# Patient Record
Sex: FEMALE | Race: WHITE | NOT HISPANIC OR LATINO | Employment: OTHER | URBAN - METROPOLITAN AREA
[De-identification: names, ages, dates, MRNs, and addresses within clinical notes are randomized per-mention and may not be internally consistent; named-entity substitution may affect disease eponyms.]

---

## 2023-11-06 ENCOUNTER — OFFICE VISIT (OUTPATIENT)
Dept: PRIMARY CARE | Facility: CLINIC | Age: 48
End: 2023-11-06
Payer: MEDICARE

## 2023-11-06 VITALS
DIASTOLIC BLOOD PRESSURE: 72 MMHG | HEIGHT: 67 IN | BODY MASS INDEX: 39.36 KG/M2 | HEART RATE: 84 BPM | WEIGHT: 250.8 LBS | SYSTOLIC BLOOD PRESSURE: 122 MMHG

## 2023-11-06 DIAGNOSIS — R23.2 HOT FLASHES: ICD-10-CM

## 2023-11-06 DIAGNOSIS — E11.42 DIABETIC POLYNEUROPATHY ASSOCIATED WITH TYPE 2 DIABETES MELLITUS (MULTI): ICD-10-CM

## 2023-11-06 DIAGNOSIS — F17.210 CIGARETTE NICOTINE DEPENDENCE WITHOUT COMPLICATION: ICD-10-CM

## 2023-11-06 DIAGNOSIS — F32.0 CURRENT MILD EPISODE OF MAJOR DEPRESSIVE DISORDER WITHOUT PRIOR EPISODE (CMS-HCC): ICD-10-CM

## 2023-11-06 DIAGNOSIS — Z23 NEED FOR INFLUENZA VACCINATION: ICD-10-CM

## 2023-11-06 DIAGNOSIS — Z23 ENCOUNTER FOR IMMUNIZATION: ICD-10-CM

## 2023-11-06 DIAGNOSIS — R41.89 COGNITIVE DECLINE: ICD-10-CM

## 2023-11-06 DIAGNOSIS — E78.5 DYSLIPIDEMIA: ICD-10-CM

## 2023-11-06 DIAGNOSIS — G43.E09 CHRONIC MIGRAINE WITH AURA WITHOUT STATUS MIGRAINOSUS, NOT INTRACTABLE: ICD-10-CM

## 2023-11-06 DIAGNOSIS — Z12.31 ENCOUNTER FOR SCREENING MAMMOGRAM FOR BREAST CANCER: ICD-10-CM

## 2023-11-06 DIAGNOSIS — F41.9 ANXIETY: ICD-10-CM

## 2023-11-06 DIAGNOSIS — M54.9 CHRONIC BACK PAIN, UNSPECIFIED BACK LOCATION, UNSPECIFIED BACK PAIN LATERALITY: ICD-10-CM

## 2023-11-06 DIAGNOSIS — R19.7 DIARRHEA, UNSPECIFIED TYPE: Primary | ICD-10-CM

## 2023-11-06 DIAGNOSIS — E11.9 TYPE 2 DIABETES MELLITUS WITHOUT COMPLICATION, WITHOUT LONG-TERM CURRENT USE OF INSULIN (MULTI): ICD-10-CM

## 2023-11-06 DIAGNOSIS — F51.01 PRIMARY INSOMNIA: ICD-10-CM

## 2023-11-06 DIAGNOSIS — K21.9 GASTROESOPHAGEAL REFLUX DISEASE, UNSPECIFIED WHETHER ESOPHAGITIS PRESENT: ICD-10-CM

## 2023-11-06 DIAGNOSIS — G89.29 CHRONIC BACK PAIN, UNSPECIFIED BACK LOCATION, UNSPECIFIED BACK PAIN LATERALITY: ICD-10-CM

## 2023-11-06 PROCEDURE — 3074F SYST BP LT 130 MM HG: CPT | Performed by: STUDENT IN AN ORGANIZED HEALTH CARE EDUCATION/TRAINING PROGRAM

## 2023-11-06 PROCEDURE — G0008 ADMIN INFLUENZA VIRUS VAC: HCPCS | Performed by: STUDENT IN AN ORGANIZED HEALTH CARE EDUCATION/TRAINING PROGRAM

## 2023-11-06 PROCEDURE — 90686 IIV4 VACC NO PRSV 0.5 ML IM: CPT | Performed by: STUDENT IN AN ORGANIZED HEALTH CARE EDUCATION/TRAINING PROGRAM

## 2023-11-06 PROCEDURE — 99205 OFFICE O/P NEW HI 60 MIN: CPT | Performed by: STUDENT IN AN ORGANIZED HEALTH CARE EDUCATION/TRAINING PROGRAM

## 2023-11-06 PROCEDURE — G0009 ADMIN PNEUMOCOCCAL VACCINE: HCPCS | Performed by: STUDENT IN AN ORGANIZED HEALTH CARE EDUCATION/TRAINING PROGRAM

## 2023-11-06 PROCEDURE — 4010F ACE/ARB THERAPY RXD/TAKEN: CPT | Performed by: STUDENT IN AN ORGANIZED HEALTH CARE EDUCATION/TRAINING PROGRAM

## 2023-11-06 PROCEDURE — 3078F DIAST BP <80 MM HG: CPT | Performed by: STUDENT IN AN ORGANIZED HEALTH CARE EDUCATION/TRAINING PROGRAM

## 2023-11-06 PROCEDURE — 90677 PCV20 VACCINE IM: CPT | Performed by: STUDENT IN AN ORGANIZED HEALTH CARE EDUCATION/TRAINING PROGRAM

## 2023-11-06 RX ORDER — FAMOTIDINE 10 MG/1
10 TABLET ORAL 2 TIMES DAILY
COMMUNITY
End: 2023-11-16 | Stop reason: SDUPTHER

## 2023-11-06 RX ORDER — ROSUVASTATIN CALCIUM 20 MG/1
1 TABLET, COATED ORAL
COMMUNITY
Start: 2021-08-29 | End: 2023-12-04 | Stop reason: SDUPTHER

## 2023-11-06 RX ORDER — DILTIAZEM HYDROCHLORIDE 60 MG/1
60 TABLET, FILM COATED ORAL 2 TIMES DAILY
COMMUNITY

## 2023-11-06 RX ORDER — BUPROPION HYDROCHLORIDE 150 MG/1
150 TABLET ORAL EVERY MORNING
Qty: 30 TABLET | Refills: 2 | Status: SHIPPED | OUTPATIENT
Start: 2023-11-06 | End: 2024-03-18 | Stop reason: SDUPTHER

## 2023-11-06 RX ORDER — DIPHENHYDRAMINE HCL 25 MG
4 CAPSULE ORAL AS NEEDED
Qty: 100 EACH | Refills: 0 | Status: SHIPPED | OUTPATIENT
Start: 2023-11-06 | End: 2024-03-24 | Stop reason: SINTOL

## 2023-11-06 RX ORDER — LISINOPRIL 5 MG/1
5 TABLET ORAL DAILY
COMMUNITY
Start: 2021-06-23

## 2023-11-06 RX ORDER — PREGABALIN 300 MG/1
300 CAPSULE ORAL 2 TIMES DAILY
COMMUNITY
Start: 2021-04-28 | End: 2023-11-16 | Stop reason: SDUPTHER

## 2023-11-06 RX ORDER — DULOXETIN HYDROCHLORIDE 60 MG/1
60 CAPSULE, DELAYED RELEASE ORAL DAILY
COMMUNITY

## 2023-11-06 RX ORDER — AMITRIPTYLINE HYDROCHLORIDE 10 MG/1
1 TABLET, FILM COATED ORAL NIGHTLY
COMMUNITY
Start: 2023-02-28 | End: 2023-12-04 | Stop reason: SDUPTHER

## 2023-11-06 RX ORDER — TIZANIDINE 2 MG/1
2 TABLET ORAL EVERY MORNING
Qty: 90 TABLET | Refills: 1 | Status: SHIPPED | OUTPATIENT
Start: 2023-11-06 | End: 2024-01-18 | Stop reason: SDUPTHER

## 2023-11-06 RX ORDER — ARIPIPRAZOLE 15 MG/1
15 TABLET ORAL DAILY
COMMUNITY
End: 2023-12-04 | Stop reason: SDUPTHER

## 2023-11-06 RX ORDER — QUETIAPINE FUMARATE 50 MG/1
1 TABLET, FILM COATED ORAL NIGHTLY
COMMUNITY
Start: 2015-11-05 | End: 2023-11-06 | Stop reason: SDUPTHER

## 2023-11-06 RX ORDER — QUETIAPINE FUMARATE 25 MG/1
1 TABLET, FILM COATED ORAL NIGHTLY
COMMUNITY
Start: 2022-08-25 | End: 2023-11-06 | Stop reason: SDUPTHER

## 2023-11-06 RX ORDER — OMEPRAZOLE 40 MG/1
40 CAPSULE, DELAYED RELEASE ORAL
Qty: 90 CAPSULE | Refills: 1 | Status: SHIPPED | OUTPATIENT
Start: 2023-11-06 | End: 2024-03-26

## 2023-11-06 RX ORDER — DULOXETIN HYDROCHLORIDE 30 MG/1
1 CAPSULE, DELAYED RELEASE ORAL DAILY
COMMUNITY
Start: 2015-11-05

## 2023-11-06 RX ORDER — DICYCLOMINE HYDROCHLORIDE 10 MG/1
10 CAPSULE ORAL 4 TIMES DAILY PRN
Qty: 30 CAPSULE | Refills: 3 | Status: SHIPPED | OUTPATIENT
Start: 2023-11-06 | End: 2023-12-18 | Stop reason: SDUPTHER

## 2023-11-06 RX ORDER — MEMANTINE HYDROCHLORIDE 10 MG/1
1 TABLET ORAL DAILY
COMMUNITY
Start: 2023-06-19 | End: 2023-12-18 | Stop reason: SDUPTHER

## 2023-11-06 RX ORDER — QUETIAPINE FUMARATE 50 MG/1
50 TABLET, FILM COATED ORAL NIGHTLY
Qty: 90 TABLET | Refills: 1 | Status: SHIPPED | OUTPATIENT
Start: 2023-11-06 | End: 2024-05-23 | Stop reason: SDUPTHER

## 2023-11-06 RX ORDER — FENOFIBRATE 40 MG/1
80 TABLET ORAL
COMMUNITY
End: 2023-12-04 | Stop reason: SDUPTHER

## 2023-11-06 RX ORDER — ALBUTEROL SULFATE 90 UG/1
2 AEROSOL, METERED RESPIRATORY (INHALATION) EVERY 6 HOURS PRN
COMMUNITY
Start: 2019-09-22 | End: 2024-03-21 | Stop reason: SDUPTHER

## 2023-11-06 RX ORDER — METFORMIN HYDROCHLORIDE 500 MG/1
1000 TABLET ORAL
COMMUNITY
Start: 2018-01-26

## 2023-11-06 RX ORDER — TIZANIDINE 2 MG/1
2 TABLET ORAL EVERY MORNING
COMMUNITY
Start: 2023-04-10 | End: 2023-11-06 | Stop reason: SDUPTHER

## 2023-11-06 RX ORDER — QUETIAPINE FUMARATE 25 MG/1
25 TABLET, FILM COATED ORAL NIGHTLY
Qty: 90 TABLET | Refills: 1 | Status: SHIPPED | OUTPATIENT
Start: 2023-11-06 | End: 2024-03-26

## 2023-11-06 NOTE — PROGRESS NOTES
Subjective   Patient ID: Usha Rowland is a 48 y.o. female who presents for establish care. Previous provider Dr Palomares at [facility] in [place]. Discuss wellbutrin to stop smoking     HPI    DM2 - diagnosed around 2013, managed on max metformin alone, struggles with neuropathy in both hands and feet, HbA1c 7.5% 9/13/2023, struggling with finances which impacts diet, uses food pantry, will try to make healthy choices with what's available, last eye check within past 1yr prior to moving to the area, hasn't established locally, plans to schedule, doesn't remember why she's taking lisinopril, doesn't think it's for BP (but also on Cardizem)    Neuropathy - managed on Lyrica (300mg bid) and Cymbalta (90mg every day) which provides relief, affects both hands and feet, states Cymbalta initially started for depression, Lyrica started in 2013, tried gabapentin first which didn't help so transitioned to Lyrica, thinks tingling and burning in fingers is getting a little worse with time, checks feet daily    HLD - managed on Crestor 20mg at bedtime and fenofibrate, tolerates without problem, tries to pay attention to diet    Migraines - started having migraines >25yrs ago after birth of son, managed on amitriptyline, uses Excedrin as needed, multiple ED visits recently, has been on multiple preventive medications in the past, thinks was on Topamax and propranolol which did not help, Imitrex did not help, >15 migraine days per month, has aura (flashing lights)    Anx/dep - managed on Cymbalta, Seroquel (also helps with sleep), Abilify, amitriptyline, has been on current regimen for many years, feeling well at this time, does have a little seasonal depression in winter months    Cognition - struggles with short term memory, started many years ago, managed on Namenda, started by neurology in [place], not sure if it's helping    Heartburn - managed on omeprazole and famotidine, has recurrence of symptoms if she misses a dose,  does have diffuse abd cramping after she eats anything, takes Metamucil which helps some, has diarrhea when she doesn't, cramping relieved by BM    Hot flashes - struggling with frequent daily hot flashes, now having some occasional night sweats, she is s/p endometrial ablation x2 so no menses    Chronic back pain - pain well-controlled on current dose of prn tizanidine, requests refill    She's not sure why she's taking the Cardizem or lisinopril - will attempt to obtain records for review    Cervical Cancer Screening: she's not sure about last pap, no hx abnormal, s/p endometrial ablation x2, no bleeding since  Breast Cancer Screening: due  Osteoporosis Screening: plan to start at age 67  Colon Cancer Screenin, [facility] in [place], thinks wnl, not sure about repeat, thinks had EGD at same time which was wnl per pt report  Tobacco: currently smoking 2ppd >20yrs, wants to quit, was on Chantix for some time but affected mental health, wants to try Wellbutrin, no hx seizure, struggles with getting patches to stick to her skin, wants to try gum  Alcohol: rare  Recreational Drugs: hx marijuana use  Immunizations: influenza and [id number] today     Review of Systems   Constitutional:  Negative for chills and fever.   HENT:  Negative for congestion and rhinorrhea.    Eyes:  Negative for pain and redness.   Respiratory:  Negative for cough and shortness of breath.    Cardiovascular:  Negative for chest pain, palpitations and leg swelling.   Gastrointestinal:  Positive for abdominal pain and diarrhea. Negative for blood in stool, constipation, nausea and vomiting.   Endocrine: Positive for heat intolerance. Negative for cold intolerance.   Genitourinary:  Negative for dysuria and flank pain.   Musculoskeletal:  Positive for back pain. Negative for arthralgias and myalgias.   Skin:  Negative for rash.   Neurological:  Positive for headaches. Negative for dizziness, weakness and numbness.   Hematological:   "Negative for adenopathy.   Psychiatric/Behavioral:  Negative for dysphoric mood and sleep disturbance. The patient is not nervous/anxious.      Objective   /72   Pulse 84   Ht 1.702 m (5' 7\")   Wt 114 kg (250 lb 12.8 oz)   BMI 39.28 kg/m²     Physical Exam  Constitutional:       Appearance: Normal appearance.   HENT:      Head: Normocephalic and atraumatic.   Eyes:      General: No scleral icterus.     Conjunctiva/sclera: Conjunctivae normal.   Cardiovascular:      Rate and Rhythm: Normal rate and regular rhythm.      Heart sounds: No murmur heard.  Pulmonary:      Effort: Pulmonary effort is normal. No respiratory distress.      Breath sounds: Normal breath sounds.   Musculoskeletal:         General: No swelling. Normal range of motion.      Cervical back: Normal range of motion and neck supple.   Skin:     General: Skin is warm and dry.      Findings: No rash.   Neurological:      General: No focal deficit present.      Mental Status: She is alert.   Psychiatric:         Mood and Affect: Mood normal.         Behavior: Behavior normal.       Assessment/Plan   Problem List Items Addressed This Visit             ICD-10-CM    Type 2 diabetes mellitus without complication, without long-term current use of insulin (CMS/[facility]) E11.9     Managed on metformin alone. Will proceed with annual DM2 labs and will follow up with results. Encourage pt to schedule annual eye exam.         Relevant Orders    Follow Up In Primary Care - Medicare Annual    Albumin , Urine Random    CBC and Auto Differential    Comprehensive Metabolic Panel    Hemoglobin A1C    Lipid Panel    TSH with reflex to Free T4 if abnormal    Vitamin B12    Primary insomnia F51.01     Well-controlled with Seroquel and amitriptyline. Will continue.         Relevant Medications    QUEtiapine (SEROquel) 50 mg tablet    QUEtiapine (SEROquel) 25 mg tablet    Hot flashes R23.2     Likely perimenopausal. Will check FSH and well as additional screening labs.   "       Relevant Orders    FSH    Gastroesophageal reflux disease K21.9     Well-controlled with PPI and famotidine. Will continue. Reviewed lifestyle modifications - avoidance of food triggers, sit upright for 30min after meals, no large meals 2hrs prior to bed. Return precautions reviewed.          Relevant Medications    omeprazole (PriLOSEC) 40 mg DR capsule    Dyslipidemia E78.5     Will check FLP and follow up with results. Continue Crestor and fenofibrate for now.         Diarrhea - Primary R19.7     Symptoms c/w IBS-D. Reviewed conservative measures and dietary goals. Will trial Bentyl. Medication dosing and side effects reviewed.          Relevant Medications    dicyclomine (Bentyl) 10 mg capsule    Other Relevant Orders    Follow Up In Primary Care - Medicare Annual    Diabetic polyneuropathy associated with type 2 diabetes mellitus (CMS/[facility]) E11.42     Paresthesias in hands and feet. Symptoms well-controlled with current doses of Cymbalta and Lyrica. Will attempt to obtain records for review.          Current mild episode of major depressive disorder without prior episode (CMS/HCC) F32.0     Doing well on current regimen (Cymbalta, Seroquel, Abilify, amitriptyline). No changes today.         Cognitive decline R41.89     Followed with neurology in [place] prior to moving to [place]. Managed on Namenda. Will provide referral for local neurology establishment.         Cigarette nicotine dependence without complication F17.210     Ready to quit. She is unable to tolerate Chantix. Will trial Wellbutrin + NRT (gum). Medication dosing and side effects reviewed. Follow up in 6wk for recheck, sooner if needed.          Relevant Medications    buPROPion XL (Wellbutrin XL) 150 mg 24 hr tablet    nicotine polacrilex (Nicorette) 4 mg gum    Other Relevant Orders    Follow Up In Primary Care - Medicare Annual    Chronic migraine with aura without status migrainosus, not intractable G43.E09     Currently managed on amitriptyline  and having >15 migraine days per month. Tried and failed Topamax, propranolol, triptans per pt report. Using shared decision making, we decided to proceed with neurology eval.         Relevant Orders    Referral to Neurology    Follow Up In Primary Care - Medicare Annual    Chronic back pain M54.9, G89.29     Doing well with tizanidine. Will continue.         Relevant Medications    tiZANidine (Zanaflex) 2 mg tablet    Anxiety F41.9     Doing well on current regimen (Cymbalta, Seroquel, Abilify, amitriptyline). No changes today.          Other Visit Diagnoses         Codes    Encounter for screening mammogram for breast cancer     Z12.31    Relevant Orders    BI mammo bilateral screening tomosynthesis    Follow Up In Primary Care - Medicare Annual    Need for influenza vaccination     Z23    Relevant Orders    Flu vaccine (IIV4) age 6 months and greater, preservative free (Completed)    Follow Up In Primary Care - Medicare Annual    Encounter for immunization     Z23    Relevant Orders    Pneumococcal conjugate vaccine, 20-valent (PREVNAR 20) (Completed)    Follow Up In Primary Care - Medicare Annual        Follow up in [facility] for recheck, sooner if needed.   Time Spent  Prep time on day of patient encounter: 4 minutes  Time spent directly with patient, family or caregiver: 41 minutes  Additional Time Spent on Patient Care Activities: 0 minutes  Documentation Time: 15 minutes  Other Time Spent: 0 minutes  Total: 60 minutes

## 2023-11-13 PROBLEM — K21.9 GASTROESOPHAGEAL REFLUX DISEASE: Status: ACTIVE | Noted: 2023-11-13

## 2023-11-13 PROBLEM — E11.9 TYPE 2 DIABETES MELLITUS WITHOUT COMPLICATION, WITHOUT LONG-TERM CURRENT USE OF INSULIN (MULTI): Status: ACTIVE | Noted: 2023-11-13

## 2023-11-13 PROBLEM — G89.29 CHRONIC BACK PAIN: Status: ACTIVE | Noted: 2023-11-13

## 2023-11-13 PROBLEM — M54.9 CHRONIC BACK PAIN: Status: ACTIVE | Noted: 2023-11-13

## 2023-11-13 PROBLEM — R41.89 COGNITIVE DECLINE: Status: ACTIVE | Noted: 2023-11-13

## 2023-11-13 PROBLEM — E11.42 DIABETIC POLYNEUROPATHY ASSOCIATED WITH TYPE 2 DIABETES MELLITUS (MULTI): Status: ACTIVE | Noted: 2023-11-13

## 2023-11-13 PROBLEM — F41.9 ANXIETY: Status: ACTIVE | Noted: 2023-11-13

## 2023-11-13 PROBLEM — R19.7 DIARRHEA: Status: ACTIVE | Noted: 2023-11-13

## 2023-11-13 PROBLEM — F17.210 CIGARETTE NICOTINE DEPENDENCE WITHOUT COMPLICATION: Status: ACTIVE | Noted: 2023-11-13

## 2023-11-13 PROBLEM — F32.0 CURRENT MILD EPISODE OF MAJOR DEPRESSIVE DISORDER WITHOUT PRIOR EPISODE (CMS-HCC): Status: ACTIVE | Noted: 2023-11-13

## 2023-11-13 PROBLEM — F51.01 PRIMARY INSOMNIA: Status: ACTIVE | Noted: 2023-11-13

## 2023-11-13 PROBLEM — R23.2 HOT FLASHES: Status: ACTIVE | Noted: 2023-11-13

## 2023-11-13 PROBLEM — E78.5 DYSLIPIDEMIA: Status: ACTIVE | Noted: 2023-11-13

## 2023-11-13 PROBLEM — G43.E09 CHRONIC MIGRAINE WITH AURA WITHOUT STATUS MIGRAINOSUS, NOT INTRACTABLE: Status: ACTIVE | Noted: 2023-11-13

## 2023-11-13 ASSESSMENT — ENCOUNTER SYMPTOMS
DYSURIA: 0
DIARRHEA: 1
EYE REDNESS: 0
COUGH: 0
CHILLS: 0
FEVER: 0
NERVOUS/ANXIOUS: 0
FLANK PAIN: 0
ABDOMINAL PAIN: 1
ADENOPATHY: 0
ARTHRALGIAS: 0
DIZZINESS: 0
NAUSEA: 0
CONSTIPATION: 0
DYSPHORIC MOOD: 0
RHINORRHEA: 0
VOMITING: 0
SHORTNESS OF BREATH: 0
BACK PAIN: 1
BLOOD IN STOOL: 0
EYE PAIN: 0
PALPITATIONS: 0
SLEEP DISTURBANCE: 0
HEADACHES: 1
NUMBNESS: 0
MYALGIAS: 0
WEAKNESS: 0

## 2023-11-13 NOTE — ASSESSMENT & PLAN NOTE
Managed on metformin alone. Will proceed with annual DM2 labs and will follow up with results. Encourage pt to schedule annual eye exam.

## 2023-11-13 NOTE — ASSESSMENT & PLAN NOTE
Well-controlled with PPI and famotidine. Will continue. Reviewed lifestyle modifications - avoidance of food triggers, sit upright for 30min after meals, no large meals 2hrs prior to bed. Return precautions reviewed.

## 2023-11-13 NOTE — ASSESSMENT & PLAN NOTE
Paresthesias in hands and feet. Symptoms well-controlled with current doses of Cymbalta and Lyrica. Will attempt to obtain records for review.

## 2023-11-13 NOTE — ASSESSMENT & PLAN NOTE
Ready to quit. She is unable to tolerate Chantix. Will trial Wellbutrin + NRT (gum). Medication dosing and side effects reviewed. Follow up in 6wk for recheck, sooner if needed.

## 2023-11-13 NOTE — ASSESSMENT & PLAN NOTE
Currently managed on amitriptyline and having >15 migraine days per month. Tried and failed Topamax, propranolol, triptans per pt report. Using shared decision making, we decided to proceed with neurology eval.

## 2023-11-13 NOTE — ASSESSMENT & PLAN NOTE
Followed with neurology in [place] prior to moving to [place]. Managed on Namenda. Will provide referral for local neurology establishment.

## 2023-11-13 NOTE — ASSESSMENT & PLAN NOTE
Symptoms c/w IBS-D. Reviewed conservative measures and dietary goals. Will trial Bentyl. Medication dosing and side effects reviewed.

## 2023-11-15 ENCOUNTER — APPOINTMENT (OUTPATIENT)
Dept: NEUROLOGY | Facility: CLINIC | Age: 48
End: 2023-11-15
Payer: MEDICARE

## 2023-11-16 DIAGNOSIS — K21.9 GASTROESOPHAGEAL REFLUX DISEASE, UNSPECIFIED WHETHER ESOPHAGITIS PRESENT: ICD-10-CM

## 2023-11-16 DIAGNOSIS — E11.42 DIABETIC POLYNEUROPATHY ASSOCIATED WITH TYPE 2 DIABETES MELLITUS (MULTI): ICD-10-CM

## 2023-11-17 RX ORDER — PREGABALIN 300 MG/1
300 CAPSULE ORAL 2 TIMES DAILY
Qty: 60 CAPSULE | Refills: 1 | Status: SHIPPED | OUTPATIENT
Start: 2023-11-17 | End: 2024-01-18 | Stop reason: SDUPTHER

## 2023-11-17 RX ORDER — FAMOTIDINE 10 MG/1
10 TABLET ORAL 2 TIMES DAILY
Qty: 60 TABLET | Refills: 1 | Status: SHIPPED | OUTPATIENT
Start: 2023-11-17 | End: 2023-12-04 | Stop reason: SDUPTHER

## 2023-12-04 DIAGNOSIS — G43.E09 CHRONIC MIGRAINE WITH AURA WITHOUT STATUS MIGRAINOSUS, NOT INTRACTABLE: ICD-10-CM

## 2023-12-04 DIAGNOSIS — E78.5 DYSLIPIDEMIA: ICD-10-CM

## 2023-12-04 DIAGNOSIS — K21.9 GASTROESOPHAGEAL REFLUX DISEASE, UNSPECIFIED WHETHER ESOPHAGITIS PRESENT: ICD-10-CM

## 2023-12-04 DIAGNOSIS — F41.9 ANXIETY: ICD-10-CM

## 2023-12-04 RX ORDER — AMITRIPTYLINE HYDROCHLORIDE 10 MG/1
10 TABLET, FILM COATED ORAL NIGHTLY
Qty: 90 TABLET | Refills: 1 | Status: SHIPPED | OUTPATIENT
Start: 2023-12-04 | End: 2024-09-18

## 2023-12-04 RX ORDER — FENOFIBRATE 40 MG/1
80 TABLET ORAL DAILY
Qty: 90 TABLET | Refills: 1 | Status: SHIPPED | OUTPATIENT
Start: 2023-12-04 | End: 2023-12-18 | Stop reason: SDUPTHER

## 2023-12-04 RX ORDER — ROSUVASTATIN CALCIUM 20 MG/1
20 TABLET, COATED ORAL
Qty: 90 TABLET | Refills: 1 | Status: SHIPPED | OUTPATIENT
Start: 2023-12-04

## 2023-12-04 RX ORDER — FAMOTIDINE 10 MG/1
10 TABLET ORAL 2 TIMES DAILY
Qty: 180 TABLET | Refills: 1 | Status: SHIPPED | OUTPATIENT
Start: 2023-12-04 | End: 2024-01-18 | Stop reason: SDUPTHER

## 2023-12-04 RX ORDER — ARIPIPRAZOLE 15 MG/1
15 TABLET ORAL DAILY
Qty: 90 TABLET | Refills: 1 | Status: SHIPPED | OUTPATIENT
Start: 2023-12-04

## 2023-12-06 ENCOUNTER — APPOINTMENT (OUTPATIENT)
Dept: NEUROLOGY | Facility: CLINIC | Age: 48
End: 2023-12-06
Payer: MEDICARE

## 2023-12-15 ENCOUNTER — LAB (OUTPATIENT)
Dept: LAB | Facility: LAB | Age: 48
End: 2023-12-15
Payer: MEDICARE

## 2023-12-15 DIAGNOSIS — R23.2 HOT FLASHES: ICD-10-CM

## 2023-12-15 DIAGNOSIS — E11.9 TYPE 2 DIABETES MELLITUS WITHOUT COMPLICATION, WITHOUT LONG-TERM CURRENT USE OF INSULIN (MULTI): ICD-10-CM

## 2023-12-15 LAB
ALBUMIN SERPL BCP-MCNC: 4.2 G/DL (ref 3.4–5)
ALP SERPL-CCNC: 140 U/L (ref 33–110)
ALT SERPL W P-5'-P-CCNC: 41 U/L (ref 7–45)
ANION GAP SERPL CALC-SCNC: 17 MMOL/L (ref 10–20)
AST SERPL W P-5'-P-CCNC: 46 U/L (ref 9–39)
BASOPHILS # BLD AUTO: 0.09 X10*3/UL (ref 0–0.1)
BASOPHILS NFR BLD AUTO: 0.8 %
BILIRUB SERPL-MCNC: 0.3 MG/DL (ref 0–1.2)
BUN SERPL-MCNC: 7 MG/DL (ref 6–23)
CALCIUM SERPL-MCNC: 9 MG/DL (ref 8.6–10.3)
CHLORIDE SERPL-SCNC: 101 MMOL/L (ref 98–107)
CHOLEST SERPL-MCNC: 175 MG/DL (ref 0–199)
CHOLESTEROL/HDL RATIO: 5.8
CO2 SERPL-SCNC: 23 MMOL/L (ref 21–32)
CREAT SERPL-MCNC: 0.46 MG/DL (ref 0.5–1.05)
CREAT UR-MCNC: 25.4 MG/DL (ref 20–320)
EOSINOPHIL # BLD AUTO: 0.25 X10*3/UL (ref 0–0.7)
EOSINOPHIL NFR BLD AUTO: 2.1 %
ERYTHROCYTE [DISTWIDTH] IN BLOOD BY AUTOMATED COUNT: 15.3 % (ref 11.5–14.5)
EST. AVERAGE GLUCOSE BLD GHB EST-MCNC: 160 MG/DL
FSH SERPL-ACNC: 6.7 IU/L
GFR SERPL CREATININE-BSD FRML MDRD: >90 ML/MIN/1.73M*2
GLUCOSE SERPL-MCNC: 166 MG/DL (ref 74–99)
HBA1C MFR BLD: 7.2 %
HCT VFR BLD AUTO: 41.9 % (ref 36–46)
HDLC SERPL-MCNC: 30 MG/DL
HGB BLD-MCNC: 13.5 G/DL (ref 12–16)
IMM GRANULOCYTES # BLD AUTO: 0.12 X10*3/UL (ref 0–0.7)
IMM GRANULOCYTES NFR BLD AUTO: 1 % (ref 0–0.9)
LDLC SERPL CALC-MCNC: ABNORMAL MG/DL
LYMPHOCYTES # BLD AUTO: 3.91 X10*3/UL (ref 1.2–4.8)
LYMPHOCYTES NFR BLD AUTO: 33.2 %
MCH RBC QN AUTO: 30.3 PG (ref 26–34)
MCHC RBC AUTO-ENTMCNC: 32.2 G/DL (ref 32–36)
MCV RBC AUTO: 94 FL (ref 80–100)
MICROALBUMIN UR-MCNC: <7 MG/L
MICROALBUMIN/CREAT UR: NORMAL MG/G{CREAT}
MONOCYTES # BLD AUTO: 0.62 X10*3/UL (ref 0.1–1)
MONOCYTES NFR BLD AUTO: 5.3 %
NEUTROPHILS # BLD AUTO: 6.79 X10*3/UL (ref 1.2–7.7)
NEUTROPHILS NFR BLD AUTO: 57.6 %
NON HDL CHOLESTEROL: 145 MG/DL (ref 0–149)
NRBC BLD-RTO: 0 /100 WBCS (ref 0–0)
PLATELET # BLD AUTO: 396 X10*3/UL (ref 150–450)
POTASSIUM SERPL-SCNC: 4 MMOL/L (ref 3.5–5.3)
PROT SERPL-MCNC: 7.4 G/DL (ref 6.4–8.2)
RBC # BLD AUTO: 4.46 X10*6/UL (ref 4–5.2)
SODIUM SERPL-SCNC: 137 MMOL/L (ref 136–145)
TRIGL SERPL-MCNC: 895 MG/DL (ref 0–149)
TSH SERPL-ACNC: 1.29 MIU/L (ref 0.44–3.98)
VIT B12 SERPL-MCNC: 222 PG/ML (ref 211–911)
VLDL: ABNORMAL
WBC # BLD AUTO: 11.8 X10*3/UL (ref 4.4–11.3)

## 2023-12-15 PROCEDURE — 85025 COMPLETE CBC W/AUTO DIFF WBC: CPT

## 2023-12-15 PROCEDURE — 80053 COMPREHEN METABOLIC PANEL: CPT

## 2023-12-15 PROCEDURE — 83001 ASSAY OF GONADOTROPIN (FSH): CPT

## 2023-12-15 PROCEDURE — 36415 COLL VENOUS BLD VENIPUNCTURE: CPT

## 2023-12-15 PROCEDURE — 83036 HEMOGLOBIN GLYCOSYLATED A1C: CPT

## 2023-12-15 PROCEDURE — 84443 ASSAY THYROID STIM HORMONE: CPT

## 2023-12-15 PROCEDURE — 82570 ASSAY OF URINE CREATININE: CPT

## 2023-12-15 PROCEDURE — 82043 UR ALBUMIN QUANTITATIVE: CPT

## 2023-12-15 PROCEDURE — 82607 VITAMIN B-12: CPT

## 2023-12-15 PROCEDURE — 80061 LIPID PANEL: CPT

## 2023-12-18 ENCOUNTER — OFFICE VISIT (OUTPATIENT)
Dept: PRIMARY CARE | Facility: CLINIC | Age: 48
End: 2023-12-18
Payer: MEDICARE

## 2023-12-18 VITALS
SYSTOLIC BLOOD PRESSURE: 122 MMHG | HEIGHT: 67 IN | BODY MASS INDEX: 39.55 KG/M2 | WEIGHT: 252 LBS | DIASTOLIC BLOOD PRESSURE: 76 MMHG | HEART RATE: 88 BPM

## 2023-12-18 DIAGNOSIS — E78.5 DYSLIPIDEMIA: ICD-10-CM

## 2023-12-18 DIAGNOSIS — D72.829 LEUKOCYTOSIS, UNSPECIFIED TYPE: ICD-10-CM

## 2023-12-18 DIAGNOSIS — G43.E09 CHRONIC MIGRAINE WITH AURA WITHOUT STATUS MIGRAINOSUS, NOT INTRACTABLE: ICD-10-CM

## 2023-12-18 DIAGNOSIS — Z00.00 ROUTINE GENERAL MEDICAL EXAMINATION AT HEALTH CARE FACILITY: Primary | ICD-10-CM

## 2023-12-18 DIAGNOSIS — F17.210 CIGARETTE NICOTINE DEPENDENCE WITHOUT COMPLICATION: ICD-10-CM

## 2023-12-18 DIAGNOSIS — R19.7 DIARRHEA, UNSPECIFIED TYPE: ICD-10-CM

## 2023-12-18 DIAGNOSIS — E11.9 TYPE 2 DIABETES MELLITUS WITHOUT COMPLICATION, WITHOUT LONG-TERM CURRENT USE OF INSULIN (MULTI): ICD-10-CM

## 2023-12-18 DIAGNOSIS — R41.89 COGNITIVE DECLINE: ICD-10-CM

## 2023-12-18 PROBLEM — R23.2 HOT FLASHES: Status: RESOLVED | Noted: 2023-11-13 | Resolved: 2023-12-18

## 2023-12-18 PROCEDURE — 99214 OFFICE O/P EST MOD 30 MIN: CPT | Performed by: STUDENT IN AN ORGANIZED HEALTH CARE EDUCATION/TRAINING PROGRAM

## 2023-12-18 PROCEDURE — 3051F HG A1C>EQUAL 7.0%<8.0%: CPT | Performed by: STUDENT IN AN ORGANIZED HEALTH CARE EDUCATION/TRAINING PROGRAM

## 2023-12-18 PROCEDURE — 3062F POS MACROALBUMINURIA REV: CPT | Performed by: STUDENT IN AN ORGANIZED HEALTH CARE EDUCATION/TRAINING PROGRAM

## 2023-12-18 PROCEDURE — 4010F ACE/ARB THERAPY RXD/TAKEN: CPT | Performed by: STUDENT IN AN ORGANIZED HEALTH CARE EDUCATION/TRAINING PROGRAM

## 2023-12-18 PROCEDURE — 3074F SYST BP LT 130 MM HG: CPT | Performed by: STUDENT IN AN ORGANIZED HEALTH CARE EDUCATION/TRAINING PROGRAM

## 2023-12-18 PROCEDURE — 3078F DIAST BP <80 MM HG: CPT | Performed by: STUDENT IN AN ORGANIZED HEALTH CARE EDUCATION/TRAINING PROGRAM

## 2023-12-18 PROCEDURE — G0439 PPPS, SUBSEQ VISIT: HCPCS | Performed by: STUDENT IN AN ORGANIZED HEALTH CARE EDUCATION/TRAINING PROGRAM

## 2023-12-18 RX ORDER — MEMANTINE HYDROCHLORIDE 10 MG/1
10 TABLET ORAL DAILY
Qty: 90 TABLET | Refills: 1 | Status: SHIPPED | OUTPATIENT
Start: 2023-12-18 | End: 2024-12-17

## 2023-12-18 RX ORDER — FENOFIBRATE 40 MG/1
80 TABLET ORAL DAILY
Qty: 180 TABLET | Refills: 1 | Status: SHIPPED | OUTPATIENT
Start: 2023-12-18

## 2023-12-18 RX ORDER — DICYCLOMINE HYDROCHLORIDE 10 MG/1
10 CAPSULE ORAL 2 TIMES DAILY PRN
Qty: 180 CAPSULE | Refills: 1 | Status: SHIPPED | OUTPATIENT
Start: 2023-12-18 | End: 2024-02-16

## 2023-12-18 ASSESSMENT — ENCOUNTER SYMPTOMS
COUGH: 0
DYSPHORIC MOOD: 0
PALPITATIONS: 0
CHILLS: 0
SHORTNESS OF BREATH: 0
FEVER: 0
NERVOUS/ANXIOUS: 0

## 2023-12-18 ASSESSMENT — ACTIVITIES OF DAILY LIVING (ADL)
MANAGING_FINANCES: INDEPENDENT
DRESSING: INDEPENDENT
TAKING_MEDICATION: NEEDS ASSISTANCE
GROCERY_SHOPPING: INDEPENDENT
BATHING: NEEDS ASSISTANCE
DOING_HOUSEWORK: NEEDS ASSISTANCE

## 2023-12-18 ASSESSMENT — PATIENT HEALTH QUESTIONNAIRE - PHQ9
SUM OF ALL RESPONSES TO PHQ9 QUESTIONS 1 AND 2: 0
1. LITTLE INTEREST OR PLEASURE IN DOING THINGS: NOT AT ALL
2. FEELING DOWN, DEPRESSED OR HOPELESS: NOT AT ALL

## 2023-12-18 NOTE — PROGRESS NOTES
Subjective   Reason for Visit: Usha Rowland is an 48 y.o. female here for a Medicare Wellness visit.     Past Medical, Surgical, and Family History reviewed and updated in chart.    Reviewed all medications by prescribing practitioner or clinical pharmacist (such as prescriptions, OTCs, herbal therapies and supplements) and documented in the medical record.    HPI  Leukocytosis - mild, has been told for many years that her WBC has been mildly elevated    DM2 - HbA1c down from 7.5 to 7.2%, managed on max metformin alone, was tried on Ozempic in the past for weight loss, tolerated but states did not lose any weight so not interested in injectable at this time, due for eye check, has hard time with diet, limited to food pantries    HLD - triglycerides >800, ran out of fenofibrate about 1mo ago    IBS-D - Bentyl is helping, denies adverse effects, would like to continue    Hot flashes - FSH wnl, hot flashes essentially resolved since last eval    Cervical Cancer Screening: she's not sure about last pap, no hx abnormal, s/p endometrial ablation x2, no bleeding since  Breast Cancer Screening: due  Osteoporosis Screening: plan to start at age 67  Colon Cancer Screenin, [facility] in [place], thinks wnl, not sure about repeat, thinks had EGD at same time which was wnl per pt report  Tobacco: 40 pack-yr hx, continues to smoke, working on weaning  Alcohol: rare  Recreational Drugs: hx marijuana use  Immunizations: utd    Patient Care Team:  Ivania Gold DO as PCP - General (Family Medicine)       Living Will: Not Received    Healthcare Power of Atty: Not Received     Advance directives: Advanced Care Planning discussed and documented. Advance care plan or surrogate decision maker documented in the medical record. Patient does not have living will. Patient does not have healthcare POA.      Review of Systems   Constitutional:  Negative for chills and fever.   Respiratory:  Negative for cough and shortness of  "breath.    Cardiovascular:  Negative for chest pain and palpitations.   Skin:  Negative for rash.   Psychiatric/Behavioral:  Negative for dysphoric mood. The patient is not nervous/anxious.      Objective   Vitals:  /76   Pulse 88   Ht 1.702 m (5' 7\")   Wt 114 kg (252 lb)   BMI 39.47 kg/m²       Physical Exam  Constitutional:       Appearance: Normal appearance.   HENT:      Head: Normocephalic and atraumatic.   Eyes:      General: No scleral icterus.     Conjunctiva/sclera: Conjunctivae normal.   Cardiovascular:      Rate and Rhythm: Normal rate and regular rhythm.      Heart sounds: No murmur heard.  Pulmonary:      Effort: Pulmonary effort is normal. No respiratory distress.      Breath sounds: Normal breath sounds.   Musculoskeletal:         General: No swelling. Normal range of motion.      Cervical back: Normal range of motion and neck supple.   Skin:     General: Skin is warm and dry.      Findings: No rash.   Neurological:      General: No focal deficit present.      Mental Status: She is alert.   Psychiatric:         Mood and Affect: Mood normal.         Behavior: Behavior normal.       Assessment/Plan   Problem List Items Addressed This Visit       Type 2 diabetes mellitus without complication, without long-term current use of insulin (CMS/[facility])    Overview     Eye Check: pending  HbA1c: 7.5% (9/2023), 7.2% (12/2023)         Current Assessment & Plan     HbA1c, goal, and tx options reviewed. Using shared decision making, we decided to trial Jardiance. Medication dosing and side effects reviewed. Repeat labs prior to next eval in 3mo. Due for eye check - referral placed to [facility].         Relevant Medications    empagliflozin (Jardiance) 10 mg    Other Relevant Orders    Comprehensive Metabolic Panel    CBC and Auto Differential    Hemoglobin A1C    Follow Up In Primary Care - Established    Referral to Ophthalmology    Disability Placard    Leukocytosis    Current Assessment & Plan     Mild, " chronic per pt report. Likely related to her smoking hx. Will continue to monitor and consider hematology eval if persistent/ worse.         Dyslipidemia    Current Assessment & Plan     TG significantly above goal - ran out of fenofibrate 1mo ago. Will refill and repeat labs prior to next eval.         Relevant Medications    fenofibrate (Fenoglide) 40 mg tablet    Other Relevant Orders    Follow Up In Primary Care - Established    Diarrhea    Current Assessment & Plan     Better with Bentyl. Will continue.         Relevant Medications    dicyclomine (Bentyl) 10 mg capsule    Other Relevant Orders    Follow Up In Primary Care - Established    Cognitive decline    Current Assessment & Plan     Neuro eval pending.         Relevant Medications    memantine (Namenda) 10 mg tablet    Other Relevant Orders    Follow Up In Primary Care - Established    Disability Placard    Cigarette nicotine dependence without complication    Overview     >40 pack-yr hx  Unable to tolerate Chantix         Current Assessment & Plan     Working on quitting. Continue Wellbutrin.         Chronic migraine with aura without status migrainosus, not intractable    Overview     Failed propranolol, Topamax, triptans         Current Assessment & Plan     Neurology eval pending.          Other Visit Diagnoses       Routine general medical examination at health care facility    -  Primary    Relevant Orders    Follow Up In Primary Care - Established        Follow up in 3mo for recheck, sooner if needed.

## 2023-12-18 NOTE — ASSESSMENT & PLAN NOTE
TG significantly above goal - ran out of fenofibrate 1mo ago. Will refill and repeat labs prior to next eval.

## 2023-12-18 NOTE — ASSESSMENT & PLAN NOTE
HbA1c, goal, and tx options reviewed. Using shared decision making, we decided to trial Jardiance. Medication dosing and side effects reviewed. Repeat labs prior to next eval in 3mo. Due for eye check - referral placed to [facility].

## 2023-12-18 NOTE — ASSESSMENT & PLAN NOTE
Mild, chronic per pt report. Likely related to her smoking hx. Will continue to monitor and consider hematology eval if persistent/ worse.

## 2024-01-02 ENCOUNTER — ANCILLARY PROCEDURE (OUTPATIENT)
Dept: RADIOLOGY | Facility: CLINIC | Age: 49
End: 2024-01-02
Payer: MEDICARE

## 2024-01-02 DIAGNOSIS — Z12.31 ENCOUNTER FOR SCREENING MAMMOGRAM FOR BREAST CANCER: ICD-10-CM

## 2024-01-02 PROCEDURE — 77067 SCR MAMMO BI INCL CAD: CPT

## 2024-01-02 PROCEDURE — 77067 SCR MAMMO BI INCL CAD: CPT | Performed by: RADIOLOGY

## 2024-01-02 PROCEDURE — 77063 BREAST TOMOSYNTHESIS BI: CPT | Performed by: RADIOLOGY

## 2024-01-09 ENCOUNTER — TELEPHONE (OUTPATIENT)
Dept: PRIMARY CARE | Facility: CLINIC | Age: 49
End: 2024-01-09
Payer: MEDICARE

## 2024-01-09 DIAGNOSIS — N90.89 VULVAR IRRITATION: Primary | ICD-10-CM

## 2024-01-09 RX ORDER — FLUCONAZOLE 150 MG/1
150 TABLET ORAL ONCE
Qty: 2 TABLET | Refills: 0 | Status: SHIPPED | OUTPATIENT
Start: 2024-01-09 | End: 2024-01-09

## 2024-01-09 NOTE — TELEPHONE ENCOUNTER
Dr. Barbara Zee returned call and stated that if pt does not have dark BM this shift to consult GI in AM, and if pt has dark BM tonight to notify her. She called and is asking for a prescription for a yeast infection. Tried otc and it isn't helping

## 2024-01-16 ENCOUNTER — TELEPHONE (OUTPATIENT)
Dept: PRIMARY CARE | Facility: CLINIC | Age: 49
End: 2024-01-16

## 2024-01-16 NOTE — TELEPHONE ENCOUNTER
----- Message from Ivania Gold, DO sent at 1/16/2024 12:03 PM EST -----  Please let patient know that her mammogram is normal. Will plan to repeat in 1yr. Thank you

## 2024-01-17 ENCOUNTER — HOSPITAL ENCOUNTER (OUTPATIENT)
Dept: RADIOLOGY | Facility: EXTERNAL LOCATION | Age: 49
Discharge: HOME | End: 2024-01-17

## 2024-01-18 DIAGNOSIS — E11.42 DIABETIC POLYNEUROPATHY ASSOCIATED WITH TYPE 2 DIABETES MELLITUS (MULTI): ICD-10-CM

## 2024-01-18 DIAGNOSIS — M54.9 CHRONIC BACK PAIN, UNSPECIFIED BACK LOCATION, UNSPECIFIED BACK PAIN LATERALITY: ICD-10-CM

## 2024-01-18 DIAGNOSIS — G89.29 CHRONIC BACK PAIN, UNSPECIFIED BACK LOCATION, UNSPECIFIED BACK PAIN LATERALITY: ICD-10-CM

## 2024-01-18 DIAGNOSIS — K21.9 GASTROESOPHAGEAL REFLUX DISEASE, UNSPECIFIED WHETHER ESOPHAGITIS PRESENT: ICD-10-CM

## 2024-01-19 RX ORDER — FAMOTIDINE 10 MG/1
10 TABLET ORAL 2 TIMES DAILY
Qty: 180 TABLET | Refills: 1 | Status: SHIPPED | OUTPATIENT
Start: 2024-01-19 | End: 2024-05-23 | Stop reason: SDUPTHER

## 2024-01-19 RX ORDER — TIZANIDINE 2 MG/1
2 TABLET ORAL EVERY MORNING
Qty: 90 TABLET | Refills: 1 | Status: SHIPPED | OUTPATIENT
Start: 2024-01-19 | End: 2024-02-29 | Stop reason: SDUPTHER

## 2024-01-19 RX ORDER — PREGABALIN 300 MG/1
300 CAPSULE ORAL 2 TIMES DAILY
Qty: 60 CAPSULE | Refills: 1 | Status: SHIPPED | OUTPATIENT
Start: 2024-01-19 | End: 2024-03-21 | Stop reason: SDUPTHER

## 2024-01-24 ENCOUNTER — APPOINTMENT (OUTPATIENT)
Dept: NEUROLOGY | Facility: CLINIC | Age: 49
End: 2024-01-24
Payer: MEDICARE

## 2024-02-29 DIAGNOSIS — G89.29 CHRONIC BACK PAIN, UNSPECIFIED BACK LOCATION, UNSPECIFIED BACK PAIN LATERALITY: ICD-10-CM

## 2024-02-29 DIAGNOSIS — M54.9 CHRONIC BACK PAIN, UNSPECIFIED BACK LOCATION, UNSPECIFIED BACK PAIN LATERALITY: ICD-10-CM

## 2024-02-29 RX ORDER — TIZANIDINE 2 MG/1
2 TABLET ORAL EVERY MORNING
Qty: 90 TABLET | Refills: 1 | Status: SHIPPED | OUTPATIENT
Start: 2024-02-29 | End: 2024-03-26

## 2024-03-16 ENCOUNTER — LAB (OUTPATIENT)
Dept: LAB | Facility: LAB | Age: 49
End: 2024-03-16
Payer: MEDICARE

## 2024-03-16 DIAGNOSIS — E11.9 TYPE 2 DIABETES MELLITUS WITHOUT COMPLICATION, WITHOUT LONG-TERM CURRENT USE OF INSULIN (MULTI): ICD-10-CM

## 2024-03-16 LAB
ALBUMIN SERPL BCP-MCNC: 4 G/DL (ref 3.4–5)
ALP SERPL-CCNC: 167 U/L (ref 33–110)
ALT SERPL W P-5'-P-CCNC: 45 U/L (ref 7–45)
ANION GAP SERPL CALC-SCNC: 20 MMOL/L (ref 10–20)
AST SERPL W P-5'-P-CCNC: 53 U/L (ref 9–39)
BASOPHILS # BLD AUTO: 0.07 X10*3/UL (ref 0–0.1)
BASOPHILS NFR BLD AUTO: 0.5 %
BILIRUB SERPL-MCNC: 0.3 MG/DL (ref 0–1.2)
BUN SERPL-MCNC: 5 MG/DL (ref 6–23)
CALCIUM SERPL-MCNC: 9.1 MG/DL (ref 8.6–10.3)
CHLORIDE SERPL-SCNC: 102 MMOL/L (ref 98–107)
CO2 SERPL-SCNC: 18 MMOL/L (ref 21–32)
CREAT SERPL-MCNC: 0.54 MG/DL (ref 0.5–1.05)
EGFRCR SERPLBLD CKD-EPI 2021: >90 ML/MIN/1.73M*2
EOSINOPHIL # BLD AUTO: 0.2 X10*3/UL (ref 0–0.7)
EOSINOPHIL NFR BLD AUTO: 1.5 %
ERYTHROCYTE [DISTWIDTH] IN BLOOD BY AUTOMATED COUNT: 15.8 % (ref 11.5–14.5)
EST. AVERAGE GLUCOSE BLD GHB EST-MCNC: 163 MG/DL
GLUCOSE SERPL-MCNC: 200 MG/DL (ref 74–99)
HBA1C MFR BLD: 7.3 %
HCT VFR BLD AUTO: 43.6 % (ref 36–46)
HGB BLD-MCNC: 14.1 G/DL (ref 12–16)
IMM GRANULOCYTES # BLD AUTO: 0.13 X10*3/UL (ref 0–0.7)
IMM GRANULOCYTES NFR BLD AUTO: 1 % (ref 0–0.9)
LYMPHOCYTES # BLD AUTO: 4.68 X10*3/UL (ref 1.2–4.8)
LYMPHOCYTES NFR BLD AUTO: 36.2 %
MCH RBC QN AUTO: 30.3 PG (ref 26–34)
MCHC RBC AUTO-ENTMCNC: 32.3 G/DL (ref 32–36)
MCV RBC AUTO: 94 FL (ref 80–100)
MONOCYTES # BLD AUTO: 0.66 X10*3/UL (ref 0.1–1)
MONOCYTES NFR BLD AUTO: 5.1 %
NEUTROPHILS # BLD AUTO: 7.2 X10*3/UL (ref 1.2–7.7)
NEUTROPHILS NFR BLD AUTO: 55.7 %
NRBC BLD-RTO: 0 /100 WBCS (ref 0–0)
PLATELET # BLD AUTO: 423 X10*3/UL (ref 150–450)
POTASSIUM SERPL-SCNC: 3.9 MMOL/L (ref 3.5–5.3)
PROT SERPL-MCNC: 7.2 G/DL (ref 6.4–8.2)
RBC # BLD AUTO: 4.66 X10*6/UL (ref 4–5.2)
SODIUM SERPL-SCNC: 136 MMOL/L (ref 136–145)
WBC # BLD AUTO: 12.9 X10*3/UL (ref 4.4–11.3)

## 2024-03-16 PROCEDURE — 36415 COLL VENOUS BLD VENIPUNCTURE: CPT

## 2024-03-16 PROCEDURE — 83036 HEMOGLOBIN GLYCOSYLATED A1C: CPT

## 2024-03-16 PROCEDURE — 80053 COMPREHEN METABOLIC PANEL: CPT

## 2024-03-16 PROCEDURE — 85025 COMPLETE CBC W/AUTO DIFF WBC: CPT

## 2024-03-18 ENCOUNTER — OFFICE VISIT (OUTPATIENT)
Dept: PRIMARY CARE | Facility: CLINIC | Age: 49
End: 2024-03-18
Payer: MEDICARE

## 2024-03-18 VITALS
HEIGHT: 67 IN | SYSTOLIC BLOOD PRESSURE: 120 MMHG | WEIGHT: 243 LBS | BODY MASS INDEX: 38.14 KG/M2 | HEART RATE: 84 BPM | DIASTOLIC BLOOD PRESSURE: 68 MMHG

## 2024-03-18 DIAGNOSIS — R74.01 TRANSAMINITIS: ICD-10-CM

## 2024-03-18 DIAGNOSIS — E11.42 DIABETIC POLYNEUROPATHY ASSOCIATED WITH TYPE 2 DIABETES MELLITUS (MULTI): ICD-10-CM

## 2024-03-18 DIAGNOSIS — F17.210 CIGARETTE NICOTINE DEPENDENCE WITHOUT COMPLICATION: ICD-10-CM

## 2024-03-18 DIAGNOSIS — E66.01 CLASS 2 SEVERE OBESITY DUE TO EXCESS CALORIES WITH SERIOUS COMORBIDITY AND BODY MASS INDEX (BMI) OF 38.0 TO 38.9 IN ADULT (MULTI): ICD-10-CM

## 2024-03-18 DIAGNOSIS — R10.84 GENERALIZED ABDOMINAL PAIN: ICD-10-CM

## 2024-03-18 DIAGNOSIS — R06.83 SNORING: Primary | ICD-10-CM

## 2024-03-18 DIAGNOSIS — F32.0 CURRENT MILD EPISODE OF MAJOR DEPRESSIVE DISORDER WITHOUT PRIOR EPISODE (CMS-HCC): ICD-10-CM

## 2024-03-18 DIAGNOSIS — D72.829 LEUKOCYTOSIS, UNSPECIFIED TYPE: ICD-10-CM

## 2024-03-18 DIAGNOSIS — E11.9 TYPE 2 DIABETES MELLITUS WITHOUT COMPLICATION, WITHOUT LONG-TERM CURRENT USE OF INSULIN (MULTI): ICD-10-CM

## 2024-03-18 DIAGNOSIS — R40.0 DAYTIME SOMNOLENCE: ICD-10-CM

## 2024-03-18 PROCEDURE — 3051F HG A1C>EQUAL 7.0%<8.0%: CPT | Performed by: STUDENT IN AN ORGANIZED HEALTH CARE EDUCATION/TRAINING PROGRAM

## 2024-03-18 PROCEDURE — 4010F ACE/ARB THERAPY RXD/TAKEN: CPT | Performed by: STUDENT IN AN ORGANIZED HEALTH CARE EDUCATION/TRAINING PROGRAM

## 2024-03-18 PROCEDURE — 3008F BODY MASS INDEX DOCD: CPT | Performed by: STUDENT IN AN ORGANIZED HEALTH CARE EDUCATION/TRAINING PROGRAM

## 2024-03-18 PROCEDURE — 3078F DIAST BP <80 MM HG: CPT | Performed by: STUDENT IN AN ORGANIZED HEALTH CARE EDUCATION/TRAINING PROGRAM

## 2024-03-18 PROCEDURE — 99214 OFFICE O/P EST MOD 30 MIN: CPT | Performed by: STUDENT IN AN ORGANIZED HEALTH CARE EDUCATION/TRAINING PROGRAM

## 2024-03-18 PROCEDURE — 3074F SYST BP LT 130 MM HG: CPT | Performed by: STUDENT IN AN ORGANIZED HEALTH CARE EDUCATION/TRAINING PROGRAM

## 2024-03-18 RX ORDER — BUPROPION HYDROCHLORIDE 300 MG/1
300 TABLET ORAL EVERY MORNING
Qty: 90 TABLET | Refills: 1 | Status: SHIPPED | OUTPATIENT
Start: 2024-03-18 | End: 2024-09-14

## 2024-03-18 NOTE — PROGRESS NOTES
"Subjective   Patient ID: Usha Rowland is a 49 y.o. female who presents for 3 month check with labs. Wellbutrin not working for smoking. Feeling tired all day long, Pain on left side of abdomen after eating.     HPI  Fatigue - x1mo, thinks she is sleeping at night but never wakes up feeling well-rested, pt snores, states has had several sleep studies in the past and was diagnosed with EVANS, tried CPAP but was hard to tolerate, has trouble with full face masks but would try alternative    DM2 - HbA1c 7.3%, doing well with new Jardiance and metformin, has been drinking more tea and Mountain Dew recently    Leukocytosis - chronic per pt    Abd pain - noticed about 2wks ago, occurs after eating, no n/v, no melena or hematochezia, only able to tolerate broth and macaroni salad, taking famotidine at night and PPI in morning    Smoking cessation - unable to tolerate nicotine gum, Wellbutrin not helping, down to 1ppd, has hard time getting patch to stick to her skin    Review of Systems   Constitutional:  Negative for chills and fever.   Respiratory:  Negative for cough and shortness of breath.    Cardiovascular:  Negative for chest pain and palpitations.   Gastrointestinal:  Positive for abdominal pain. Negative for blood in stool, diarrhea, nausea and vomiting.   Skin:  Negative for rash.   Psychiatric/Behavioral:  Negative for dysphoric mood. The patient is not nervous/anxious.      Objective   /68   Pulse 84   Ht 1.702 m (5' 7\")   Wt 110 kg (243 lb)   BMI 38.06 kg/m²     Physical Exam  Vitals reviewed.   Constitutional:       General: She is not in acute distress.     Appearance: Normal appearance.   HENT:      Head: Normocephalic and atraumatic.   Eyes:      General: No scleral icterus.     Conjunctiva/sclera: Conjunctivae normal.   Cardiovascular:      Rate and Rhythm: Normal rate and regular rhythm.      Heart sounds: No murmur heard.  Pulmonary:      Effort: Pulmonary effort is normal. No respiratory " distress.      Breath sounds: Normal breath sounds.   Abdominal:      General: Bowel sounds are normal. There is no distension.      Palpations: Abdomen is soft.      Tenderness: There is abdominal tenderness (mild L-sided). There is no guarding or rebound.   Musculoskeletal:         General: No swelling or deformity.      Cervical back: Normal range of motion and neck supple.   Skin:     General: Skin is warm and dry.      Findings: No rash.   Neurological:      General: No focal deficit present.      Mental Status: She is alert.   Psychiatric:         Mood and Affect: Mood normal.         Behavior: Behavior normal.       Assessment/Plan   Problem List Items Addressed This Visit             ICD-10-CM    Type 2 diabetes mellitus without complication, without long-term current use of insulin (CMS/[facility]) E11.9     Tolerating new Jardiance, but HbA1c increased to 7.3%. Discussed options. Will increase Jardiance to 25mg every day. Medication dosing and side effects reviewed. Dietary modifications and recommendation for 150 minutes of moderate-intensity exercise per week reviewed.          Relevant Medications    empagliflozin (Jardiance) 25 mg    Other Relevant Orders    Hemoglobin A1C    Follow Up In Primary Care - Established    Transaminitis R74.01     Will proceed with abd ultrasound.         Relevant Orders    Follow Up In Primary Care - Established    US abdomen complete (Completed)    Snoring - Primary R06.83     With hx EVANS per pt report. Last sleep study multiple years ago prior to moving. Will proceed with HST and will follow up with results.         Relevant Orders    Home sleep apnea test (HSAT)    Follow Up In Primary Care - Established    Leukocytosis D72.829     Chronic per pt report. Stable. Will continue to monitor.         Generalized abdominal pain R10.84     Postprandial x2wks with L-sided abd pain. Will proceed with abd us for further eval and will follow up with results. If unrevealing and  persistently symptomatic, will consider GI eval. Continue PPI and H2 blocker for now.         Relevant Orders    Follow Up In Primary Care - Established    US abdomen complete (Completed)    Diabetic polyneuropathy associated with type 2 diabetes mellitus (CMS/[facility]) E11.42     Stable.         Daytime somnolence R40.0     HST.         Relevant Orders    Home sleep apnea test (HSAT)    Follow Up In Primary Care - Established    Current mild episode of major depressive disorder without prior episode (CMS/[facility]) F32.0     Stable.         Class 2 severe obesity due to excess calories with serious comorbidity and body mass index (BMI) of 38.0 to 38.9 in adult (CMS/[facility]) E66.01, Z68.38    Cigarette nicotine dependence without complication F17.210     Down to 1ppd. Will increase Wellbutrin to 300mg every day. Medication dosing and side effects reviewed.          Relevant Medications    buPROPion XL (Wellbutrin XL) 300 mg 24 hr tablet    Other Relevant Orders    Follow Up In Primary Care - Established   Follow up in 3mo for recheck, sooner if needed.

## 2024-03-19 ENCOUNTER — HOSPITAL ENCOUNTER (OUTPATIENT)
Dept: RADIOLOGY | Facility: CLINIC | Age: 49
Discharge: HOME | End: 2024-03-19
Payer: MEDICARE

## 2024-03-19 DIAGNOSIS — R10.84 GENERALIZED ABDOMINAL PAIN: ICD-10-CM

## 2024-03-19 DIAGNOSIS — R74.01 TRANSAMINITIS: ICD-10-CM

## 2024-03-19 PROCEDURE — 76700 US EXAM ABDOM COMPLETE: CPT | Performed by: RADIOLOGY

## 2024-03-19 PROCEDURE — 76700 US EXAM ABDOM COMPLETE: CPT

## 2024-03-20 PROBLEM — K76.0 HEPATIC STEATOSIS: Status: ACTIVE | Noted: 2024-03-20

## 2024-03-21 DIAGNOSIS — E11.42 DIABETIC POLYNEUROPATHY ASSOCIATED WITH TYPE 2 DIABETES MELLITUS (MULTI): ICD-10-CM

## 2024-03-21 RX ORDER — PREGABALIN 300 MG/1
300 CAPSULE ORAL 2 TIMES DAILY
Qty: 60 CAPSULE | Refills: 1 | Status: SHIPPED | OUTPATIENT
Start: 2024-03-21 | End: 2024-05-23 | Stop reason: SDUPTHER

## 2024-03-21 RX ORDER — ALBUTEROL SULFATE 90 UG/1
2 AEROSOL, METERED RESPIRATORY (INHALATION) EVERY 6 HOURS PRN
Qty: 18 G | Refills: 2 | Status: SHIPPED | OUTPATIENT
Start: 2024-03-21

## 2024-03-24 PROBLEM — E66.812 CLASS 2 SEVERE OBESITY DUE TO EXCESS CALORIES WITH SERIOUS COMORBIDITY AND BODY MASS INDEX (BMI) OF 38.0 TO 38.9 IN ADULT: Status: ACTIVE | Noted: 2024-03-24

## 2024-03-24 PROBLEM — R10.84 GENERALIZED ABDOMINAL PAIN: Status: ACTIVE | Noted: 2024-03-24

## 2024-03-24 PROBLEM — R40.0 DAYTIME SOMNOLENCE: Status: ACTIVE | Noted: 2024-03-24

## 2024-03-24 PROBLEM — E66.01 CLASS 2 SEVERE OBESITY DUE TO EXCESS CALORIES WITH SERIOUS COMORBIDITY AND BODY MASS INDEX (BMI) OF 38.0 TO 38.9 IN ADULT (MULTI): Status: ACTIVE | Noted: 2024-03-24

## 2024-03-24 PROBLEM — R06.83 SNORING: Status: ACTIVE | Noted: 2024-03-24

## 2024-03-24 PROBLEM — R74.01 TRANSAMINITIS: Status: ACTIVE | Noted: 2024-03-24

## 2024-03-24 ASSESSMENT — ENCOUNTER SYMPTOMS
PALPITATIONS: 0
ABDOMINAL PAIN: 1
NERVOUS/ANXIOUS: 0
VOMITING: 0
CHILLS: 0
FEVER: 0
COUGH: 0
DIARRHEA: 0
NAUSEA: 0
DYSPHORIC MOOD: 0
BLOOD IN STOOL: 0
SHORTNESS OF BREATH: 0

## 2024-03-25 ENCOUNTER — CLINICAL SUPPORT (OUTPATIENT)
Dept: SLEEP MEDICINE | Facility: HOSPITAL | Age: 49
End: 2024-03-25
Payer: MEDICARE

## 2024-03-25 VITALS — BODY MASS INDEX: 38.14 KG/M2 | HEIGHT: 67 IN | WEIGHT: 243 LBS

## 2024-03-25 DIAGNOSIS — F51.01 PRIMARY INSOMNIA: ICD-10-CM

## 2024-03-25 DIAGNOSIS — R40.0 DAYTIME SOMNOLENCE: ICD-10-CM

## 2024-03-25 DIAGNOSIS — R06.83 SNORING: ICD-10-CM

## 2024-03-25 DIAGNOSIS — K21.9 GASTROESOPHAGEAL REFLUX DISEASE, UNSPECIFIED WHETHER ESOPHAGITIS PRESENT: ICD-10-CM

## 2024-03-25 DIAGNOSIS — G89.29 CHRONIC BACK PAIN, UNSPECIFIED BACK LOCATION, UNSPECIFIED BACK PAIN LATERALITY: ICD-10-CM

## 2024-03-25 DIAGNOSIS — M54.9 CHRONIC BACK PAIN, UNSPECIFIED BACK LOCATION, UNSPECIFIED BACK PAIN LATERALITY: ICD-10-CM

## 2024-03-25 PROCEDURE — 9420000001 HC RT PATIENT EDUCATION 5 MIN

## 2024-03-25 NOTE — ASSESSMENT & PLAN NOTE
Tolerating new Jardiance, but HbA1c increased to 7.3%. Discussed options. Will increase Jardiance to 25mg every day. Medication dosing and side effects reviewed. Dietary modifications and recommendation for 150 minutes of moderate-intensity exercise per week reviewed.

## 2024-03-25 NOTE — ASSESSMENT & PLAN NOTE
Down to 1ppd. Will increase Wellbutrin to 300mg every day. Medication dosing and side effects reviewed.

## 2024-03-25 NOTE — ASSESSMENT & PLAN NOTE
With hx EVANS per pt report. Last sleep study multiple years ago prior to moving. Will proceed with HST and will follow up with results.

## 2024-03-25 NOTE — ASSESSMENT & PLAN NOTE
Postprandial x2wks with L-sided abd pain. Will proceed with abd us for further eval and will follow up with results. If unrevealing and persistently symptomatic, will consider GI eval. Continue PPI and H2 blocker for now.

## 2024-03-25 NOTE — PROGRESS NOTES
[facility] TECH NOTE:     Patient: Usha Rowland   MRN//AGE: [id number]  1975  49 y.o.   Technologist: Rima Bermudez RRT-SDS   Room: Home Sleep Study_Nomad   Service Date: 3/25/2024        Sleep Testing Location: [place] [address]      [place]: 8    TECHNOLOGIST SLEEP STUDY PROCEDURE NOTE:   This sleep study is being conducted according to the policies and procedures outlined by the AAS accreditation standards.  The sleep study procedure and processes involved during this appointment was explained to the patient/patient’s family, questions were answered. The patient/family verbalized understanding.      The patient is a 49 y.o. year old female scheduled for Home Sleep Apnea Test.    The full study Was completed.  Patient questionnaires completed?: yes     Consents signed? yes    Initial Fall Risk Screening:     Usha has not fallen in the last 6 months. She did not result in injury. Usha does have a fear of falling. She does not need assistance with sitting, standing, or walking. She does not need assistance walking in her home. She does not need assistance in an unfamiliar setting. The patient is not using an assistive device.     Brief Study observations:     Deviation to order/protocol and reason:      If PAP, which was preferred mask/pressure/mode:     Other: The patient received equipment and instructions for use of the [employer] Nomad HSAT device. The patient was instructed how to apply the effort belts, cannula, thermistor. It was also explained how the Nomad and oximeter components work.       The patient was informed of their responsibility for the device and acknowledged this by signing the HSAT device contract. The patient was asked to return the device on the next day and was shown where the drop off box was located.     After the procedure, the patient/family was informed to ensure followup with ordering clinician for testing results.      Technologist: Rima TAN  Lara, RRT-SDS

## 2024-03-26 DIAGNOSIS — R40.0 DAYTIME SOMNOLENCE: ICD-10-CM

## 2024-03-26 DIAGNOSIS — R06.83 SNORING: Primary | ICD-10-CM

## 2024-03-26 RX ORDER — TIZANIDINE 2 MG/1
TABLET ORAL
Qty: 90 TABLET | Refills: 1 | Status: SHIPPED | OUTPATIENT
Start: 2024-03-26

## 2024-03-26 RX ORDER — QUETIAPINE FUMARATE 25 MG/1
25 TABLET, FILM COATED ORAL NIGHTLY
Qty: 90 TABLET | Refills: 1 | Status: SHIPPED | OUTPATIENT
Start: 2024-03-26

## 2024-03-26 RX ORDER — OMEPRAZOLE 40 MG/1
CAPSULE, DELAYED RELEASE ORAL
Qty: 90 CAPSULE | Refills: 1 | Status: SHIPPED | OUTPATIENT
Start: 2024-03-26

## 2024-03-26 NOTE — PROGRESS NOTES
Patient was unable to do the home sleep apnea test, due to having a bad night.  Patient would like to do an in lab study.  Ivania Gold was notified about patient not being able to complete the at home test and would like to do an in lab sleep study test.

## 2024-03-26 NOTE — TELEPHONE ENCOUNTER
Patients nely called in to say that the in-home sleep study did not go well because Usha was getting too anxious with everyone in her house. They are requesting that she has a sleep study done at a facility.

## 2024-03-27 ENCOUNTER — TELEPHONE (OUTPATIENT)
Dept: PRIMARY CARE | Facility: CLINIC | Age: 49
End: 2024-03-27
Payer: MEDICARE

## 2024-03-27 DIAGNOSIS — M54.50 CHRONIC BILATERAL LOW BACK PAIN, UNSPECIFIED WHETHER SCIATICA PRESENT: Primary | ICD-10-CM

## 2024-03-27 DIAGNOSIS — G89.29 CHRONIC BILATERAL LOW BACK PAIN, UNSPECIFIED WHETHER SCIATICA PRESENT: Primary | ICD-10-CM

## 2024-03-27 NOTE — TELEPHONE ENCOUNTER
----- Message from Ivania Gold DO sent at 3/20/2024  9:44 PM EDT -----  Please let patient know that her ultrasound showed fatty liver but otherwise looked good. How is she feeling? Thank you

## 2024-04-10 ENCOUNTER — PROCEDURE VISIT (OUTPATIENT)
Dept: SLEEP MEDICINE | Facility: CLINIC | Age: 49
End: 2024-04-10
Payer: MEDICARE

## 2024-04-10 DIAGNOSIS — G47.63 SLEEP RELATED BRUXISM: ICD-10-CM

## 2024-04-10 DIAGNOSIS — R06.83 SNORING: ICD-10-CM

## 2024-04-10 DIAGNOSIS — G47.33 OBSTRUCTIVE SLEEP APNEA (ADULT) (PEDIATRIC): ICD-10-CM

## 2024-04-10 DIAGNOSIS — R40.0 DAYTIME SOMNOLENCE: ICD-10-CM

## 2024-04-10 PROCEDURE — 95810 POLYSOM 6/> YRS 4/> PARAM: CPT | Performed by: PSYCHIATRY & NEUROLOGY

## 2024-04-10 ASSESSMENT — SLEEP AND FATIGUE QUESTIONNAIRES
HOW LIKELY ARE YOU TO NOD OFF OR FALL ASLEEP WHILE WATCHING TV: MODERATE CHANCE OF DOZING
HOW LIKELY ARE YOU TO NOD OFF OR FALL ASLEEP WHILE SITTING AND READING: MODERATE CHANCE OF DOZING
SITING INACTIVE IN A PUBLIC PLACE LIKE A CLASS ROOM OR A MOVIE THEATER: HIGH CHANCE OF DOZING
HOW LIKELY ARE YOU TO NOD OFF OR FALL ASLEEP WHILE LYING DOWN TO REST IN THE AFTERNOON WHEN CIRCUMSTANCES PERMIT: HIGH CHANCE OF DOZING
HOW LIKELY ARE YOU TO NOD OFF OR FALL ASLEEP WHILE SITTING AND TALKING TO SOMEONE: SLIGHT CHANCE OF DOZING
HOW LIKELY ARE YOU TO NOD OFF OR FALL ASLEEP WHILE SITTING QUIETLY AFTER LUNCH WITHOUT ALCOHOL: MODERATE CHANCE OF DOZING
ESS-CHAD TOTAL SCORE: 16
HOW LIKELY ARE YOU TO NOD OFF OR FALL ASLEEP WHEN YOU ARE A PASSENGER IN A CAR FOR AN HOUR WITHOUT A BREAK: HIGH CHANCE OF DOZING
HOW LIKELY ARE YOU TO NOD OFF OR FALL ASLEEP IN A CAR, WHILE STOPPED FOR A FEW MINUTES IN TRAFFIC: WOULD NEVER DOZE

## 2024-04-11 VITALS
DIASTOLIC BLOOD PRESSURE: 76 MMHG | TEMPERATURE: 97.2 F | HEIGHT: 67 IN | WEIGHT: 242.51 LBS | BODY MASS INDEX: 38.06 KG/M2 | SYSTOLIC BLOOD PRESSURE: 115 MMHG

## 2024-04-11 NOTE — PROGRESS NOTES
[facility] TECH NOTE:     Patient: Usha Rowland   MRN//AGE: [id number]  1975  49 y.o.   Technologist: YULISSA Shipley   Room: 4   Service Date: 2024        Sleep Testing Location: [place] [address]     [place]: 16    TECHNOLOGIST SLEEP STUDY PROCEDURE NOTE:   This sleep study is being conducted according to the policies and procedures outlined by the AAS accreditation standards.  The sleep study procedure and processes involved during this appointment was explained to the patient and all questions were answered. The patient verbalized understanding.      The patient is a 49 y.o. year old female scheduled for a Polysomnogram  with montage of:  PSG MASTER  .    The study that was ultimately completed was a Polysomnogram  with montage of:  PSG MASTER  .    The full study Was completed.  Patient questionnaires completed?: yes     Consents signed? yes    Initial Fall Risk Screening:     Usha has not fallen in the last 6 months. . Usha does have a fear of falling. He does not need assistance with sitting, standing, or walking. she does not need assistance walking in her home. she does not need assistance in an unfamiliar setting. The patient is notusing an assistive device.     Brief Study observations: Room 4. PSG. Supplemental oxygen added mid-study due to Spo2 values below 88% consistently. Tachycardia noted as well as Tachypnea throughout the night.    Deviation to order/protocol and reason: Added supplemental oxygen during this study. 2LPM       After the procedure, the patient was informed to ensure followup with ordering clinician for testing results.      Technologist: YULISSA Shipley

## 2024-04-18 DIAGNOSIS — G47.33 OSA (OBSTRUCTIVE SLEEP APNEA): Primary | ICD-10-CM

## 2024-04-22 DIAGNOSIS — F51.01 PRIMARY INSOMNIA: ICD-10-CM

## 2024-04-22 DIAGNOSIS — F41.9 ANXIETY: ICD-10-CM

## 2024-04-22 DIAGNOSIS — E78.5 DYSLIPIDEMIA: ICD-10-CM

## 2024-04-22 DIAGNOSIS — G43.E09 CHRONIC MIGRAINE WITH AURA WITHOUT STATUS MIGRAINOSUS, NOT INTRACTABLE: ICD-10-CM

## 2024-04-22 RX ORDER — ROSUVASTATIN CALCIUM 20 MG/1
TABLET, COATED ORAL
Qty: 90 TABLET | Refills: 1 | OUTPATIENT
Start: 2024-04-22

## 2024-04-22 RX ORDER — ARIPIPRAZOLE 15 MG/1
15 TABLET ORAL DAILY
Qty: 90 TABLET | Refills: 1 | OUTPATIENT
Start: 2024-04-22

## 2024-04-22 RX ORDER — QUETIAPINE FUMARATE 50 MG/1
50 TABLET, FILM COATED ORAL NIGHTLY
Qty: 90 TABLET | Refills: 1 | OUTPATIENT
Start: 2024-04-22

## 2024-04-22 RX ORDER — AMITRIPTYLINE HYDROCHLORIDE 10 MG/1
10 TABLET, FILM COATED ORAL NIGHTLY
Qty: 90 TABLET | Refills: 1 | OUTPATIENT
Start: 2024-04-22

## 2024-05-13 ENCOUNTER — OFFICE VISIT (OUTPATIENT)
Dept: PULMONOLOGY | Facility: CLINIC | Age: 49
End: 2024-05-13
Payer: MEDICARE

## 2024-05-13 VITALS
DIASTOLIC BLOOD PRESSURE: 80 MMHG | HEART RATE: 109 BPM | SYSTOLIC BLOOD PRESSURE: 122 MMHG | TEMPERATURE: 97.5 F | WEIGHT: 240 LBS | HEIGHT: 67 IN | BODY MASS INDEX: 37.67 KG/M2 | OXYGEN SATURATION: 93 %

## 2024-05-13 DIAGNOSIS — R06.81: ICD-10-CM

## 2024-05-13 DIAGNOSIS — G47.34 NOCTURNAL HYPOXEMIA: Primary | ICD-10-CM

## 2024-05-13 PROCEDURE — 3008F BODY MASS INDEX DOCD: CPT | Performed by: INTERNAL MEDICINE

## 2024-05-13 PROCEDURE — 99204 OFFICE O/P NEW MOD 45 MIN: CPT | Performed by: INTERNAL MEDICINE

## 2024-05-13 PROCEDURE — 3074F SYST BP LT 130 MM HG: CPT | Performed by: INTERNAL MEDICINE

## 2024-05-13 PROCEDURE — 4010F ACE/ARB THERAPY RXD/TAKEN: CPT | Performed by: INTERNAL MEDICINE

## 2024-05-13 PROCEDURE — 3079F DIAST BP 80-89 MM HG: CPT | Performed by: INTERNAL MEDICINE

## 2024-05-13 PROCEDURE — 3051F HG A1C>EQUAL 7.0%<8.0%: CPT | Performed by: INTERNAL MEDICINE

## 2024-05-13 RX ORDER — PSYLLIUM HUSK 0.4 G
5 CAPSULE ORAL 4 TIMES DAILY
COMMUNITY

## 2024-05-14 ENCOUNTER — TELEPHONE (OUTPATIENT)
Dept: PULMONOLOGY | Facility: CLINIC | Age: 49
End: 2024-05-14
Payer: MEDICARE

## 2024-05-14 NOTE — TELEPHONE ENCOUNTER
O2 order received but sleep study/pulse ox documentation is more than 30 days old.  I advised ok for [facility] to perform overnight Pulse ox. They will send us result and we will need documentation we reviewed and pt needs O2 with a new order.  This will be stated in fax from [facility] with results.

## 2024-05-14 NOTE — PROGRESS NOTES
Subjective   Patient ID: Usha Rowland is a 49 y.o. female who presents for Sleep Apnea.  HPI  This is a 49-year-old  female who I was asked to see because of the need to review a sleep study that she had on 4/10/2024.  On this study she was titrated on 2 L/min of oxygen at nighttime.  She did not meet criteria for CPAP therapy with an RDI of 4% at 2.3/h.  She does have [employer] and Medicaid as her insurance.  She normally drinks Mountain Dew and tea during the day.  She has been told that she does do some snoring at nighttime she has no observed choking or gasping or stop breathing episodes.  She occasionally has morning headaches which she attributes to stress.  She will take some occasional naps during the day about 2-3 times per week.  Her ESS was 16/24.  She has noted problems with her sleep since .  She currently goes to bed at 11 PM and is up at 7 AM.  She uses Seroquel at bedtime.  With the use of this medication she generally sleeps well through the night.  She reports a , tubal ligation and cholecystectomy.  Family medical history is positive for diabetes and her brother wears CPAP device.  The patient admits to 1-1/2 to 2 pack/day smoking history for 28 years.  She denies any use of alcohol.  Review of Systems  Patient's weight is decreased about 20 pounds over the past year.  She has non-insulin-dependent diabetes mellitus, GERD and discomfort in her knees and back.  She also has springtime allergy symptoms.  She is medically disabled.  Objective   Physical Exam  HEENT patient has a class I airway and chin was in good position.  Her oxygen saturation today on room air was 95%.  Pulmonary, lungs were clear to auscultation.  Cardio, heart sounds were regular rate and rhythm.  GI, bowel sounds were heard in all quadrants with no tenderness on palpation of the abdomen.  Extremities no pretibial edema cyanosis or clubbing.  Psych, the patient is alert and oriented x 3.  Assessment/Plan         Impressions:  1.  Nocturnal hypoxemia.  2.  The patient does not qualify for CPAP therapy.  Recommendations:  1.  Oxygen at 2 L/min at nighttime via nasal cannula.  2.  Follow-up with the patient in 2 months.      This note was transcribed using the Dragon Dictation system.  There may be grammatical, punctuation, or verbiage errors that occur with voice recognition programs.    Yousif Johnson DO 05/14/24 7:55 AM

## 2024-05-15 ENCOUNTER — TELEPHONE (OUTPATIENT)
Dept: PRIMARY CARE | Facility: CLINIC | Age: 49
End: 2024-05-15
Payer: MEDICARE

## 2024-05-15 DIAGNOSIS — N90.89 VULVAR IRRITATION: Primary | ICD-10-CM

## 2024-05-15 RX ORDER — FLUCONAZOLE 150 MG/1
150 TABLET ORAL ONCE
Qty: 2 TABLET | Refills: 0 | Status: SHIPPED | OUTPATIENT
Start: 2024-05-15 | End: 2024-05-15

## 2024-05-23 DIAGNOSIS — K21.9 GASTROESOPHAGEAL REFLUX DISEASE, UNSPECIFIED WHETHER ESOPHAGITIS PRESENT: ICD-10-CM

## 2024-05-23 DIAGNOSIS — F51.01 PRIMARY INSOMNIA: ICD-10-CM

## 2024-05-23 DIAGNOSIS — E11.42 DIABETIC POLYNEUROPATHY ASSOCIATED WITH TYPE 2 DIABETES MELLITUS (MULTI): ICD-10-CM

## 2024-05-23 RX ORDER — FAMOTIDINE 10 MG/1
10 TABLET ORAL 2 TIMES DAILY
Qty: 180 TABLET | Refills: 1 | Status: SHIPPED | OUTPATIENT
Start: 2024-05-23

## 2024-05-23 RX ORDER — QUETIAPINE FUMARATE 50 MG/1
50 TABLET, FILM COATED ORAL NIGHTLY
Qty: 90 TABLET | Refills: 1 | Status: SHIPPED | OUTPATIENT
Start: 2024-05-23 | End: 2025-05-23

## 2024-05-23 RX ORDER — PREGABALIN 300 MG/1
300 CAPSULE ORAL 2 TIMES DAILY
Qty: 60 CAPSULE | Refills: 1 | Status: SHIPPED | OUTPATIENT
Start: 2024-05-23 | End: 2026-12-14

## 2024-06-05 ENCOUNTER — TELEPHONE (OUTPATIENT)
Dept: PULMONOLOGY | Facility: CLINIC | Age: 49
End: 2024-06-05
Payer: MEDICARE

## 2024-06-05 NOTE — TELEPHONE ENCOUNTER
"Phone call to pt [phone], lady who answered confirmed wrong number.  Phone call to Carmela.  I advised Carmela pt number on file is not correct.  I advised Carmela pt does not need overnight oxygen per new overnight pulse ox study.  Pt got on the phone and was advised.  Pt stated so she does not need a PAP or oxygen at night?  Pt advised I will ask Dr. Johnson about PAP.  Pt said, \"No don't ask him its useless.  Goodbye.\" And phone was disconnected on pt end.  "

## 2024-06-18 ENCOUNTER — APPOINTMENT (OUTPATIENT)
Dept: PRIMARY CARE | Facility: CLINIC | Age: 49
End: 2024-06-18
Payer: MEDICARE

## 2024-06-18 VITALS
BODY MASS INDEX: 37.54 KG/M2 | WEIGHT: 239.2 LBS | DIASTOLIC BLOOD PRESSURE: 82 MMHG | SYSTOLIC BLOOD PRESSURE: 130 MMHG | HEART RATE: 76 BPM | HEIGHT: 67 IN

## 2024-06-18 DIAGNOSIS — H66.90 ACUTE OTITIS MEDIA, UNSPECIFIED OTITIS MEDIA TYPE: ICD-10-CM

## 2024-06-18 DIAGNOSIS — F17.210 CIGARETTE NICOTINE DEPENDENCE WITHOUT COMPLICATION: ICD-10-CM

## 2024-06-18 DIAGNOSIS — R19.7 DIARRHEA, UNSPECIFIED TYPE: Primary | ICD-10-CM

## 2024-06-18 DIAGNOSIS — Z12.11 ENCOUNTER FOR SCREENING FOR MALIGNANT NEOPLASM OF COLON: ICD-10-CM

## 2024-06-18 DIAGNOSIS — R10.84 GENERALIZED ABDOMINAL PAIN: ICD-10-CM

## 2024-06-18 DIAGNOSIS — F90.9 ATTENTION DEFICIT HYPERACTIVITY DISORDER (ADHD), UNSPECIFIED ADHD TYPE: ICD-10-CM

## 2024-06-18 PROCEDURE — 4010F ACE/ARB THERAPY RXD/TAKEN: CPT | Performed by: STUDENT IN AN ORGANIZED HEALTH CARE EDUCATION/TRAINING PROGRAM

## 2024-06-18 PROCEDURE — 3051F HG A1C>EQUAL 7.0%<8.0%: CPT | Performed by: STUDENT IN AN ORGANIZED HEALTH CARE EDUCATION/TRAINING PROGRAM

## 2024-06-18 PROCEDURE — 3075F SYST BP GE 130 - 139MM HG: CPT | Performed by: STUDENT IN AN ORGANIZED HEALTH CARE EDUCATION/TRAINING PROGRAM

## 2024-06-18 PROCEDURE — 3079F DIAST BP 80-89 MM HG: CPT | Performed by: STUDENT IN AN ORGANIZED HEALTH CARE EDUCATION/TRAINING PROGRAM

## 2024-06-18 PROCEDURE — 3008F BODY MASS INDEX DOCD: CPT | Performed by: STUDENT IN AN ORGANIZED HEALTH CARE EDUCATION/TRAINING PROGRAM

## 2024-06-18 PROCEDURE — 99214 OFFICE O/P EST MOD 30 MIN: CPT | Performed by: STUDENT IN AN ORGANIZED HEALTH CARE EDUCATION/TRAINING PROGRAM

## 2024-06-18 RX ORDER — DICYCLOMINE HYDROCHLORIDE 10 MG/1
CAPSULE ORAL
COMMUNITY
Start: 2024-05-18 | End: 2024-06-24 | Stop reason: SDUPTHER

## 2024-06-18 RX ORDER — AMOXICILLIN AND CLAVULANATE POTASSIUM 875; 125 MG/1; MG/1
1 TABLET, FILM COATED ORAL 2 TIMES DAILY
Qty: 14 TABLET | Refills: 0 | Status: SHIPPED | OUTPATIENT
Start: 2024-06-18 | End: 2024-06-25

## 2024-06-18 RX ORDER — BUPROPION HYDROCHLORIDE 150 MG/1
150 TABLET ORAL EVERY MORNING
Qty: 90 TABLET | Refills: 1 | Status: SHIPPED | OUTPATIENT
Start: 2024-06-18 | End: 2024-12-15

## 2024-06-18 RX ORDER — FLUTICASONE PROPIONATE 50 MCG
1 SPRAY, SUSPENSION (ML) NASAL DAILY
Qty: 16 G | Refills: 1 | Status: SHIPPED | OUTPATIENT
Start: 2024-06-18 | End: 2025-06-18

## 2024-06-18 NOTE — PROGRESS NOTES
"Subjective   Patient ID: Usha Rowland is a 49 y.o. female who presents for 3 month check. Has been to the ER twice recently for stomach pain. Thinks may have IBS.     HPI   Abd pain - has been struggling with epigastric abd pain which is only present after eating, has not been able to identify food or other trigger, pain improved after BM which is usually NB diarrhea, did have one episode of dark stool but was taking Pepto, Bentyl was rx but not helping much, she is due for screening colonoscopy    Smoking cessation - she is down to 1ppd despite the added stress of school    Concentration - she is in online school for [employer], having difficulty with focus and concentration, hard time reading which is increasing her stress level, she stresses the importance of her degree and wonders about restarting Adderall which was rx in the past for ADHD and helped significantly with her grades in school, states that the stimulant was stopped after her friend stole her medication, states was diagnosed with ADHD after psychiatry eval in the past, her difficulty with concentration and focus is affecting multiple aspects of her life    L ear pain - started about 1wk ago while taking doxycycline that was started in ED due to cough    Review of Systems   Constitutional:  Negative for chills and fever.   HENT:  Positive for ear pain.    Respiratory:  Negative for cough and shortness of breath.    Cardiovascular:  Negative for chest pain and palpitations.   Gastrointestinal:  Positive for abdominal pain and diarrhea.   Skin:  Negative for rash.   Psychiatric/Behavioral:  Positive for decreased concentration. Negative for dysphoric mood. The patient is not nervous/anxious.      Objective   /82   Pulse 76   Ht 1.702 m (5' 7\")   Wt 109 kg (239 lb 3.2 oz)   BMI 37.46 kg/m²     Physical Exam  Constitutional:       Appearance: Normal appearance.   HENT:      Head: Normocephalic and atraumatic.      Right Ear: Tympanic membrane, " ear canal and external ear normal.      Left Ear: Ear canal and external ear normal.      Ears:      Comments: L purulent effusion with bulging TM and loss of landmarks  Eyes:      General: No scleral icterus.     Conjunctiva/sclera: Conjunctivae normal.   Cardiovascular:      Rate and Rhythm: Normal rate and regular rhythm.      Heart sounds: No murmur heard.  Pulmonary:      Effort: Pulmonary effort is normal. No respiratory distress.      Breath sounds: Normal breath sounds.   Musculoskeletal:         General: No swelling. Normal range of motion.      Cervical back: Normal range of motion and neck supple.   Skin:     General: Skin is warm and dry.      Findings: No rash.   Neurological:      General: No focal deficit present.      Mental Status: She is alert.   Psychiatric:         Mood and Affect: Mood normal.         Behavior: Behavior normal.       Assessment/Plan   Problem List Items Addressed This Visit             ICD-10-CM    Generalized abdominal pain R10.84     GI eval.         Diarrhea - Primary R19.7     Reviewed ED eval x2. Given persistence of symptoms despite dietary adjustment and Bentyl, we decided to proceed with GI evaluation. She is also due for screening colonoscopy.         Relevant Orders    Referral to Gastroenterology    Cigarette nicotine dependence without complication F17.210     Down to 1ppd which was congratulated. Encouraged continued wean.         Relevant Medications    buPROPion XL (Wellbutrin XL) 150 mg 24 hr tablet    Attention deficit hyperactivity disorder (ADHD) F90.9     Symptoms significantly improved with Adderall in the past. Will start by trying to obtain records for review.         Relevant Medications    buPROPion XL (Wellbutrin XL) 150 mg 24 hr tablet    Acute otitis media H66.90     Purulent L-sided effusion on exam. Will tx with Augmentin. Medication dosing and side effects reviewed.          Relevant Medications    fluticasone (Flonase) 50 mcg/actuation nasal spray      Other Visit Diagnoses         Codes    Encounter for screening for malignant neoplasm of colon     Z12.11    Relevant Orders    Referral to Gastroenterology

## 2024-06-24 DIAGNOSIS — R19.7 DIARRHEA, UNSPECIFIED TYPE: ICD-10-CM

## 2024-06-24 DIAGNOSIS — E11.9 TYPE 2 DIABETES MELLITUS WITHOUT COMPLICATION, WITHOUT LONG-TERM CURRENT USE OF INSULIN (MULTI): ICD-10-CM

## 2024-06-24 DIAGNOSIS — M54.9 CHRONIC BACK PAIN, UNSPECIFIED BACK LOCATION, UNSPECIFIED BACK PAIN LATERALITY: ICD-10-CM

## 2024-06-24 DIAGNOSIS — F41.9 ANXIETY: ICD-10-CM

## 2024-06-24 DIAGNOSIS — R41.89 COGNITIVE DECLINE: ICD-10-CM

## 2024-06-24 DIAGNOSIS — F32.0 CURRENT MILD EPISODE OF MAJOR DEPRESSIVE DISORDER WITHOUT PRIOR EPISODE (CMS-HCC): ICD-10-CM

## 2024-06-24 DIAGNOSIS — G89.29 CHRONIC BACK PAIN, UNSPECIFIED BACK LOCATION, UNSPECIFIED BACK PAIN LATERALITY: ICD-10-CM

## 2024-06-24 DIAGNOSIS — R74.01 TRANSAMINITIS: ICD-10-CM

## 2024-06-24 DIAGNOSIS — E11.42 DIABETIC POLYNEUROPATHY ASSOCIATED WITH TYPE 2 DIABETES MELLITUS (MULTI): ICD-10-CM

## 2024-06-24 DIAGNOSIS — E78.5 DYSLIPIDEMIA: ICD-10-CM

## 2024-06-24 RX ORDER — ARIPIPRAZOLE 15 MG/1
15 TABLET ORAL DAILY
Qty: 90 TABLET | Refills: 1 | Status: SHIPPED | OUTPATIENT
Start: 2024-06-24

## 2024-06-24 RX ORDER — DULOXETIN HYDROCHLORIDE 60 MG/1
60 CAPSULE, DELAYED RELEASE ORAL DAILY
Qty: 90 CAPSULE | Refills: 1 | Status: SHIPPED | OUTPATIENT
Start: 2024-06-24

## 2024-06-24 RX ORDER — LOPERAMIDE HYDROCHLORIDE 2 MG/1
2 CAPSULE ORAL 4 TIMES DAILY PRN
Qty: 30 CAPSULE | Refills: 2 | Status: SHIPPED | OUTPATIENT
Start: 2024-06-24

## 2024-06-24 RX ORDER — METFORMIN HYDROCHLORIDE 500 MG/1
1000 TABLET ORAL
Qty: 180 TABLET | Refills: 1 | Status: SHIPPED | OUTPATIENT
Start: 2024-06-24

## 2024-06-24 RX ORDER — ROSUVASTATIN CALCIUM 20 MG/1
20 TABLET, COATED ORAL
Qty: 90 TABLET | Refills: 1 | Status: SHIPPED | OUTPATIENT
Start: 2024-06-24

## 2024-06-24 RX ORDER — LOPERAMIDE HYDROCHLORIDE 2 MG/1
2 CAPSULE ORAL 4 TIMES DAILY PRN
COMMUNITY
Start: 2024-06-10 | End: 2024-06-24 | Stop reason: SDUPTHER

## 2024-06-24 RX ORDER — DULOXETIN HYDROCHLORIDE 30 MG/1
30 CAPSULE, DELAYED RELEASE ORAL DAILY
Qty: 90 CAPSULE | Refills: 1 | Status: SHIPPED | OUTPATIENT
Start: 2024-06-24

## 2024-06-24 RX ORDER — ALBUTEROL SULFATE 90 UG/1
2 AEROSOL, METERED RESPIRATORY (INHALATION) EVERY 6 HOURS PRN
Qty: 18 G | Refills: 2 | Status: SHIPPED | OUTPATIENT
Start: 2024-06-24

## 2024-06-24 RX ORDER — MEMANTINE HYDROCHLORIDE 10 MG/1
10 TABLET ORAL DAILY
Qty: 90 TABLET | Refills: 1 | Status: SHIPPED | OUTPATIENT
Start: 2024-06-24 | End: 2025-06-24

## 2024-06-24 RX ORDER — DICYCLOMINE HYDROCHLORIDE 10 MG/1
CAPSULE ORAL
Qty: 60 CAPSULE | Refills: 2 | Status: SHIPPED | OUTPATIENT
Start: 2024-06-24

## 2024-06-24 RX ORDER — TIZANIDINE 2 MG/1
TABLET ORAL
Qty: 90 TABLET | Refills: 1 | Status: SHIPPED | OUTPATIENT
Start: 2024-06-24

## 2024-06-24 RX ORDER — DILTIAZEM HYDROCHLORIDE 60 MG/1
60 TABLET, FILM COATED ORAL 2 TIMES DAILY
Qty: 180 TABLET | Refills: 1 | Status: SHIPPED | OUTPATIENT
Start: 2024-06-24

## 2024-06-30 PROBLEM — H66.90 ACUTE OTITIS MEDIA: Status: ACTIVE | Noted: 2024-06-30

## 2024-06-30 PROBLEM — F90.9 ATTENTION DEFICIT HYPERACTIVITY DISORDER (ADHD): Status: ACTIVE | Noted: 2024-06-30

## 2024-06-30 ASSESSMENT — ENCOUNTER SYMPTOMS
DIARRHEA: 1
NERVOUS/ANXIOUS: 0
FEVER: 0
ABDOMINAL PAIN: 1
CHILLS: 0
SHORTNESS OF BREATH: 0
COUGH: 0
DYSPHORIC MOOD: 0
PALPITATIONS: 0
DECREASED CONCENTRATION: 1

## 2024-06-30 NOTE — ASSESSMENT & PLAN NOTE
Purulent L-sided effusion on exam. Will tx with Augmentin. Medication dosing and side effects reviewed.

## 2024-06-30 NOTE — ASSESSMENT & PLAN NOTE
Reviewed ED eval x2. Given persistence of symptoms despite dietary adjustment and Bentyl, we decided to proceed with GI evaluation. She is also due for screening colonoscopy.

## 2024-06-30 NOTE — ASSESSMENT & PLAN NOTE
Symptoms significantly improved with Adderall in the past. Will start by trying to obtain records for review.

## 2024-07-15 ENCOUNTER — TELEPHONE (OUTPATIENT)
Dept: PRIMARY CARE | Facility: CLINIC | Age: 49
End: 2024-07-15

## 2024-07-15 ENCOUNTER — APPOINTMENT (OUTPATIENT)
Dept: PULMONOLOGY | Facility: CLINIC | Age: 49
End: 2024-07-15
Payer: MEDICARE

## 2024-07-15 VITALS
WEIGHT: 238 LBS | HEART RATE: 121 BPM | SYSTOLIC BLOOD PRESSURE: 128 MMHG | DIASTOLIC BLOOD PRESSURE: 84 MMHG | HEIGHT: 67 IN | TEMPERATURE: 96.9 F | OXYGEN SATURATION: 92 % | BODY MASS INDEX: 37.35 KG/M2

## 2024-07-15 DIAGNOSIS — R06.09 DYSPNEA ON EXERTION: ICD-10-CM

## 2024-07-15 DIAGNOSIS — J44.9 OBSTRUCTIVE AIRWAY DISEASE (MULTI): Primary | ICD-10-CM

## 2024-07-15 PROCEDURE — 3079F DIAST BP 80-89 MM HG: CPT | Performed by: INTERNAL MEDICINE

## 2024-07-15 PROCEDURE — 3008F BODY MASS INDEX DOCD: CPT | Performed by: INTERNAL MEDICINE

## 2024-07-15 PROCEDURE — 3051F HG A1C>EQUAL 7.0%<8.0%: CPT | Performed by: INTERNAL MEDICINE

## 2024-07-15 PROCEDURE — 3074F SYST BP LT 130 MM HG: CPT | Performed by: INTERNAL MEDICINE

## 2024-07-15 PROCEDURE — 99214 OFFICE O/P EST MOD 30 MIN: CPT | Performed by: INTERNAL MEDICINE

## 2024-07-15 PROCEDURE — 4010F ACE/ARB THERAPY RXD/TAKEN: CPT | Performed by: INTERNAL MEDICINE

## 2024-07-15 NOTE — TELEPHONE ENCOUNTER
She called to see if you got her records? Wanting to start Adderall, said she has become extremlely frustrated with school.

## 2024-07-15 NOTE — PROGRESS NOTES
Subjective   Patient ID: Usha Rowland is a 49 y.o. female who presents for No chief complaint on file..  HPI  Patient was seen today in the office on a 2-month follow-up visit.  Her peak flow has improved to 410 L/min.  Her oxygen saturation on room air at rest in the office was 95%.  Her heart rate however was 121.  She reports that she drinks a sixpack of caffeinated iced tea every other day.  Patient reports that she is still smoking but has reduced that to 1 pack/day now for the past month or 2.  She denies any phlegm production.  She does have an albuterol inhaler that she uses on a as needed basis which she says is usually 1-2 times per month.  She has mild cough but scant sputum production.  She denies any dyspnea with usual daily activities.  Review of Systems  Patient denies any fever, chills, rhinitis or sore throat.  Objective   Physical Exam  Pulmonary, lungs are clear to auscultation bilaterally.  Cardio, heart sounds are regular but tachycardic with a heart rate of 120+  Extremities, no pretibial edema cyanosis or clubbing  Psych, the patient is alert and oriented x 3.  She did not appear dyspneic at rest in the office today.  Assessment/Plan        Impressions:  1.  Dyspnea on increased exertion.  2.  Past history of asthma.  Recommendations:  1.  Discontinue drinking caffeinated tea on a daily basis.  2.  Discontinue smoking discontinue smoking  3.  Will schedule her for a pulmonary function test at [facility].  4.  FeNO.  5.  Follow-up with the patient after the above testing is completed      This note was transcribed using the Dragon Dictation system.  There may be grammatical, punctuation, or verbiage errors that occur with voice recognition programs.    Yousif Johnson,  07/15/24 3:45 PM

## 2024-07-18 ENCOUNTER — HOSPITAL ENCOUNTER (OUTPATIENT)
Dept: RESPIRATORY THERAPY | Facility: HOSPITAL | Age: 49
Discharge: HOME | End: 2024-07-18
Payer: MEDICARE

## 2024-07-18 ENCOUNTER — TELEPHONE (OUTPATIENT)
Dept: PRIMARY CARE | Facility: CLINIC | Age: 49
End: 2024-07-18
Payer: MEDICARE

## 2024-07-18 DIAGNOSIS — E11.42 DIABETIC POLYNEUROPATHY ASSOCIATED WITH TYPE 2 DIABETES MELLITUS (MULTI): ICD-10-CM

## 2024-07-18 DIAGNOSIS — J44.9 OBSTRUCTIVE AIRWAY DISEASE (MULTI): ICD-10-CM

## 2024-07-18 DIAGNOSIS — N90.89 VULVAR IRRITATION: Primary | ICD-10-CM

## 2024-07-18 RX ORDER — PREGABALIN 300 MG/1
300 CAPSULE ORAL 2 TIMES DAILY
Qty: 60 CAPSULE | Refills: 1 | Status: SHIPPED | OUTPATIENT
Start: 2024-07-18 | End: 2027-02-08

## 2024-07-18 RX ORDER — FLUCONAZOLE 150 MG/1
150 TABLET ORAL ONCE
Qty: 2 TABLET | Refills: 0 | Status: SHIPPED | OUTPATIENT
Start: 2024-07-18 | End: 2024-07-18

## 2024-07-25 ENCOUNTER — APPOINTMENT (OUTPATIENT)
Dept: PULMONOLOGY | Facility: CLINIC | Age: 49
End: 2024-07-25
Payer: MEDICARE

## 2024-07-25 VITALS
BODY MASS INDEX: 36.88 KG/M2 | TEMPERATURE: 97.1 F | WEIGHT: 235 LBS | HEART RATE: 111 BPM | SYSTOLIC BLOOD PRESSURE: 126 MMHG | HEIGHT: 67 IN | OXYGEN SATURATION: 94 % | DIASTOLIC BLOOD PRESSURE: 88 MMHG

## 2024-07-25 DIAGNOSIS — F17.200 CURRENT EVERY DAY SMOKER: ICD-10-CM

## 2024-07-25 DIAGNOSIS — R06.09 DYSPNEA ON EXERTION: Primary | ICD-10-CM

## 2024-07-25 DIAGNOSIS — Z78.9: ICD-10-CM

## 2024-07-25 PROCEDURE — 3074F SYST BP LT 130 MM HG: CPT | Performed by: INTERNAL MEDICINE

## 2024-07-25 PROCEDURE — 3008F BODY MASS INDEX DOCD: CPT | Performed by: INTERNAL MEDICINE

## 2024-07-25 PROCEDURE — 99214 OFFICE O/P EST MOD 30 MIN: CPT | Performed by: INTERNAL MEDICINE

## 2024-07-25 PROCEDURE — 4010F ACE/ARB THERAPY RXD/TAKEN: CPT | Performed by: INTERNAL MEDICINE

## 2024-07-25 PROCEDURE — 3051F HG A1C>EQUAL 7.0%<8.0%: CPT | Performed by: INTERNAL MEDICINE

## 2024-07-25 PROCEDURE — 3079F DIAST BP 80-89 MM HG: CPT | Performed by: INTERNAL MEDICINE

## 2024-07-25 NOTE — PROGRESS NOTES
Subjective   Patient ID: Usha Rowland is a 49 y.o. female who presents for No chief complaint on file..  HPI  Patient was seen today in the office for follow-up visit to go over her pulmonary function test results and also her FeNO results.  Both results were in the normal range.  She currently is not having significant difficulty with shortness of breath.  She had discussed with me that she had a past history of asthma however these test do not show any indication of inflammatory airway disease or bronchospasm.  She still has a elevated baseline heart rate of 111.  She is currently on Wellbutrin to assist her with her nicotine withdrawal.  We did discuss that the smoking is probably contributing to her tachycardia and possibly her caffeinated beverages.  Review of Systems  No other changes in her review of systems.  Objective   Physical Exam  Pulmonary, lungs were clear to auscultation with the peak flow of 430.  Cardio, heart sounds were tachycardic but regular.  Extremities, no pretibial edema cyanosis or clubbing.  Patient does not appear dyspneic at rest in the office today.  Assessment/Plan        Impressions:  1.  Dyspnea on increased exertion.  Multifactorial etiology.  This would include fitness and weight.  Recommendations:  1.  Discontinue smoking.  2.  Discontinue caffeinated beverages.  3.  Consider daily exercise.      This note was transcribed using the Dragon Dictation system.  There may be grammatical, punctuation, or verbiage errors that occur with voice recognition programs..  I do not have her scheduled for any further follow-up visits.    Yousif Johnson,  07/25/24 1:22 PM

## 2024-08-16 DIAGNOSIS — M54.9 CHRONIC BACK PAIN, UNSPECIFIED BACK LOCATION, UNSPECIFIED BACK PAIN LATERALITY: ICD-10-CM

## 2024-08-16 DIAGNOSIS — G89.29 CHRONIC BACK PAIN, UNSPECIFIED BACK LOCATION, UNSPECIFIED BACK PAIN LATERALITY: ICD-10-CM

## 2024-08-16 RX ORDER — TIZANIDINE 2 MG/1
TABLET ORAL
Qty: 90 TABLET | Refills: 1 | Status: SHIPPED | OUTPATIENT
Start: 2024-08-16

## 2024-09-16 DIAGNOSIS — E11.42 DIABETIC POLYNEUROPATHY ASSOCIATED WITH TYPE 2 DIABETES MELLITUS (MULTI): ICD-10-CM

## 2024-09-16 DIAGNOSIS — N90.89 VULVAR IRRITATION: Primary | ICD-10-CM

## 2024-09-16 DIAGNOSIS — E11.9 TYPE 2 DIABETES MELLITUS WITHOUT COMPLICATION, WITHOUT LONG-TERM CURRENT USE OF INSULIN (MULTI): ICD-10-CM

## 2024-09-16 RX ORDER — PREGABALIN 300 MG/1
300 CAPSULE ORAL 2 TIMES DAILY
Qty: 60 CAPSULE | Refills: 1 | Status: SHIPPED | OUTPATIENT
Start: 2024-09-16 | End: 2027-04-09

## 2024-09-16 RX ORDER — FLUCONAZOLE 150 MG/1
150 TABLET ORAL ONCE
Qty: 2 TABLET | Refills: 0 | Status: SHIPPED | OUTPATIENT
Start: 2024-09-16 | End: 2024-09-16

## 2024-09-24 ENCOUNTER — APPOINTMENT (OUTPATIENT)
Dept: PRIMARY CARE | Facility: CLINIC | Age: 49
End: 2024-09-24
Payer: MEDICARE

## 2024-10-02 ENCOUNTER — APPOINTMENT (OUTPATIENT)
Dept: PRIMARY CARE | Facility: CLINIC | Age: 49
End: 2024-10-02
Payer: MEDICARE

## 2024-10-15 DIAGNOSIS — G89.29 CHRONIC BACK PAIN, UNSPECIFIED BACK LOCATION, UNSPECIFIED BACK PAIN LATERALITY: ICD-10-CM

## 2024-10-15 DIAGNOSIS — K21.9 GASTROESOPHAGEAL REFLUX DISEASE, UNSPECIFIED WHETHER ESOPHAGITIS PRESENT: ICD-10-CM

## 2024-10-15 DIAGNOSIS — M54.9 CHRONIC BACK PAIN, UNSPECIFIED BACK LOCATION, UNSPECIFIED BACK PAIN LATERALITY: ICD-10-CM

## 2024-10-15 DIAGNOSIS — F51.01 PRIMARY INSOMNIA: ICD-10-CM

## 2024-10-15 RX ORDER — TIZANIDINE 2 MG/1
TABLET ORAL
Qty: 90 TABLET | Refills: 3 | Status: SHIPPED | OUTPATIENT
Start: 2024-10-15

## 2024-10-15 RX ORDER — QUETIAPINE FUMARATE 25 MG/1
25 TABLET, FILM COATED ORAL NIGHTLY
Qty: 90 TABLET | Refills: 1 | Status: SHIPPED | OUTPATIENT
Start: 2024-10-15

## 2024-10-15 RX ORDER — OMEPRAZOLE 40 MG/1
40 CAPSULE, DELAYED RELEASE ORAL EVERY MORNING
Qty: 90 CAPSULE | Refills: 1 | Status: SHIPPED | OUTPATIENT
Start: 2024-10-15

## 2024-10-22 ENCOUNTER — APPOINTMENT (OUTPATIENT)
Dept: PRIMARY CARE | Facility: CLINIC | Age: 49
End: 2024-10-22
Payer: MEDICARE

## 2024-11-13 ENCOUNTER — APPOINTMENT (OUTPATIENT)
Dept: PRIMARY CARE | Facility: CLINIC | Age: 49
End: 2024-11-13
Payer: MEDICARE

## 2024-11-14 DIAGNOSIS — E11.42 DIABETIC POLYNEUROPATHY ASSOCIATED WITH TYPE 2 DIABETES MELLITUS (MULTI): ICD-10-CM

## 2024-11-14 DIAGNOSIS — F51.01 PRIMARY INSOMNIA: ICD-10-CM

## 2024-11-14 RX ORDER — QUETIAPINE FUMARATE 25 MG/1
25 TABLET, FILM COATED ORAL NIGHTLY
Qty: 30 TABLET | Refills: 1 | Status: SHIPPED | OUTPATIENT
Start: 2024-11-14

## 2024-11-14 RX ORDER — QUETIAPINE FUMARATE 50 MG/1
50 TABLET, FILM COATED ORAL NIGHTLY
Qty: 30 TABLET | Refills: 1 | Status: SHIPPED | OUTPATIENT
Start: 2024-11-14 | End: 2025-11-14

## 2024-11-14 RX ORDER — PREGABALIN 300 MG/1
300 CAPSULE ORAL 2 TIMES DAILY
Qty: 60 CAPSULE | Refills: 1 | Status: SHIPPED | OUTPATIENT
Start: 2024-11-14 | End: 2027-06-07

## 2024-12-06 DIAGNOSIS — R19.7 DIARRHEA, UNSPECIFIED TYPE: ICD-10-CM

## 2024-12-06 DIAGNOSIS — R41.89 COGNITIVE DECLINE: ICD-10-CM

## 2024-12-06 DIAGNOSIS — E11.9 TYPE 2 DIABETES MELLITUS WITHOUT COMPLICATION, WITHOUT LONG-TERM CURRENT USE OF INSULIN (MULTI): ICD-10-CM

## 2024-12-06 DIAGNOSIS — E78.5 DYSLIPIDEMIA: ICD-10-CM

## 2024-12-06 DIAGNOSIS — R74.01 TRANSAMINITIS: ICD-10-CM

## 2024-12-06 DIAGNOSIS — F32.0 CURRENT MILD EPISODE OF MAJOR DEPRESSIVE DISORDER WITHOUT PRIOR EPISODE (CMS-HCC): ICD-10-CM

## 2024-12-06 DIAGNOSIS — F90.9 ATTENTION DEFICIT HYPERACTIVITY DISORDER (ADHD), UNSPECIFIED ADHD TYPE: ICD-10-CM

## 2024-12-06 DIAGNOSIS — F41.9 ANXIETY: ICD-10-CM

## 2024-12-06 DIAGNOSIS — F17.210 CIGARETTE NICOTINE DEPENDENCE WITHOUT COMPLICATION: ICD-10-CM

## 2024-12-06 RX ORDER — METFORMIN HYDROCHLORIDE 500 MG/1
1000 TABLET ORAL
Qty: 60 TABLET | Refills: 0 | Status: SHIPPED | OUTPATIENT
Start: 2024-12-06

## 2024-12-06 RX ORDER — ARIPIPRAZOLE 15 MG/1
15 TABLET ORAL DAILY
Qty: 30 TABLET | Refills: 0 | Status: SHIPPED | OUTPATIENT
Start: 2024-12-06

## 2024-12-06 RX ORDER — DULOXETIN HYDROCHLORIDE 30 MG/1
30 CAPSULE, DELAYED RELEASE ORAL DAILY
Qty: 30 CAPSULE | Refills: 0 | Status: SHIPPED | OUTPATIENT
Start: 2024-12-06

## 2024-12-06 RX ORDER — DILTIAZEM HYDROCHLORIDE 60 MG/1
60 TABLET, FILM COATED ORAL 2 TIMES DAILY
Qty: 60 TABLET | Refills: 0 | Status: SHIPPED | OUTPATIENT
Start: 2024-12-06

## 2024-12-06 RX ORDER — ROSUVASTATIN CALCIUM 20 MG/1
20 TABLET, COATED ORAL
Qty: 30 TABLET | Refills: 0 | Status: SHIPPED | OUTPATIENT
Start: 2024-12-06

## 2024-12-06 RX ORDER — MEMANTINE HYDROCHLORIDE 10 MG/1
10 TABLET ORAL DAILY
Qty: 30 TABLET | Refills: 0 | Status: SHIPPED | OUTPATIENT
Start: 2024-12-06 | End: 2025-12-06

## 2024-12-06 RX ORDER — BUPROPION HYDROCHLORIDE 150 MG/1
150 TABLET ORAL EVERY MORNING
Qty: 30 TABLET | Refills: 0 | Status: SHIPPED | OUTPATIENT
Start: 2024-12-06 | End: 2025-06-04

## 2024-12-06 RX ORDER — BUPROPION HYDROCHLORIDE 300 MG/1
300 TABLET ORAL EVERY MORNING
Qty: 30 TABLET | Refills: 0 | Status: SHIPPED | OUTPATIENT
Start: 2024-12-06 | End: 2025-06-04

## 2024-12-06 RX ORDER — DULOXETIN HYDROCHLORIDE 60 MG/1
60 CAPSULE, DELAYED RELEASE ORAL DAILY
Qty: 30 CAPSULE | Refills: 0 | Status: SHIPPED | OUTPATIENT
Start: 2024-12-06

## 2024-12-17 ENCOUNTER — LAB (OUTPATIENT)
Dept: LAB | Facility: LAB | Age: 49
End: 2024-12-17
Payer: MEDICARE

## 2024-12-17 DIAGNOSIS — E11.9 TYPE 2 DIABETES MELLITUS WITHOUT COMPLICATION, WITHOUT LONG-TERM CURRENT USE OF INSULIN (MULTI): ICD-10-CM

## 2024-12-17 PROCEDURE — 83036 HEMOGLOBIN GLYCOSYLATED A1C: CPT

## 2024-12-18 LAB
EST. AVERAGE GLUCOSE BLD GHB EST-MCNC: 203 MG/DL
HBA1C MFR BLD: 8.7 %

## 2024-12-19 ENCOUNTER — APPOINTMENT (OUTPATIENT)
Dept: PRIMARY CARE | Facility: CLINIC | Age: 49
End: 2024-12-19
Payer: MEDICARE

## 2024-12-19 VITALS
DIASTOLIC BLOOD PRESSURE: 82 MMHG | BODY MASS INDEX: 37.48 KG/M2 | HEART RATE: 80 BPM | HEIGHT: 67 IN | SYSTOLIC BLOOD PRESSURE: 110 MMHG | WEIGHT: 238.8 LBS

## 2024-12-19 DIAGNOSIS — Z23 ENCOUNTER FOR IMMUNIZATION: ICD-10-CM

## 2024-12-19 DIAGNOSIS — I10 PRIMARY HYPERTENSION: ICD-10-CM

## 2024-12-19 DIAGNOSIS — E11.9 TYPE 2 DIABETES MELLITUS WITHOUT COMPLICATION, WITHOUT LONG-TERM CURRENT USE OF INSULIN (MULTI): ICD-10-CM

## 2024-12-19 DIAGNOSIS — Z00.00 ROUTINE GENERAL MEDICAL EXAMINATION AT HEALTH CARE FACILITY: Primary | ICD-10-CM

## 2024-12-19 DIAGNOSIS — Z12.31 ENCOUNTER FOR SCREENING MAMMOGRAM FOR BREAST CANCER: ICD-10-CM

## 2024-12-19 DIAGNOSIS — Z79.899 ON STIMULANT MEDICATION: ICD-10-CM

## 2024-12-19 DIAGNOSIS — F51.01 PRIMARY INSOMNIA: ICD-10-CM

## 2024-12-19 DIAGNOSIS — E78.5 DYSLIPIDEMIA: ICD-10-CM

## 2024-12-19 DIAGNOSIS — R41.89 COGNITIVE DECLINE: ICD-10-CM

## 2024-12-19 DIAGNOSIS — E11.42 DIABETIC POLYNEUROPATHY ASSOCIATED WITH TYPE 2 DIABETES MELLITUS (MULTI): ICD-10-CM

## 2024-12-19 DIAGNOSIS — F32.0 CURRENT MILD EPISODE OF MAJOR DEPRESSIVE DISORDER WITHOUT PRIOR EPISODE (CMS-HCC): ICD-10-CM

## 2024-12-19 DIAGNOSIS — F17.210 CIGARETTE NICOTINE DEPENDENCE WITHOUT COMPLICATION: ICD-10-CM

## 2024-12-19 DIAGNOSIS — F41.9 ANXIETY: ICD-10-CM

## 2024-12-19 DIAGNOSIS — K21.9 GASTROESOPHAGEAL REFLUX DISEASE, UNSPECIFIED WHETHER ESOPHAGITIS PRESENT: ICD-10-CM

## 2024-12-19 DIAGNOSIS — F90.9 ATTENTION DEFICIT HYPERACTIVITY DISORDER (ADHD), UNSPECIFIED ADHD TYPE: ICD-10-CM

## 2024-12-19 LAB
AMPHETAMINES UR QL SCN: ABNORMAL
BARBITURATES UR QL SCN: ABNORMAL
BENZODIAZ UR QL SCN: ABNORMAL
BZE UR QL SCN: ABNORMAL
CANNABINOIDS UR QL SCN: ABNORMAL
FENTANYL+NORFENTANYL UR QL SCN: ABNORMAL
METHADONE UR QL SCN: ABNORMAL
OPIATES UR QL SCN: ABNORMAL
OXYCODONE+OXYMORPHONE UR QL SCN: ABNORMAL
PCP UR QL SCN: ABNORMAL

## 2024-12-19 PROCEDURE — 3052F HG A1C>EQUAL 8.0%<EQUAL 9.0%: CPT | Performed by: STUDENT IN AN ORGANIZED HEALTH CARE EDUCATION/TRAINING PROGRAM

## 2024-12-19 PROCEDURE — 3074F SYST BP LT 130 MM HG: CPT | Performed by: STUDENT IN AN ORGANIZED HEALTH CARE EDUCATION/TRAINING PROGRAM

## 2024-12-19 PROCEDURE — G0008 ADMIN INFLUENZA VIRUS VAC: HCPCS | Performed by: STUDENT IN AN ORGANIZED HEALTH CARE EDUCATION/TRAINING PROGRAM

## 2024-12-19 PROCEDURE — 80307 DRUG TEST PRSMV CHEM ANLYZR: CPT

## 2024-12-19 PROCEDURE — 3079F DIAST BP 80-89 MM HG: CPT | Performed by: STUDENT IN AN ORGANIZED HEALTH CARE EDUCATION/TRAINING PROGRAM

## 2024-12-19 PROCEDURE — 80349 CANNABINOIDS NATURAL: CPT

## 2024-12-19 PROCEDURE — 4010F ACE/ARB THERAPY RXD/TAKEN: CPT | Performed by: STUDENT IN AN ORGANIZED HEALTH CARE EDUCATION/TRAINING PROGRAM

## 2024-12-19 PROCEDURE — 99214 OFFICE O/P EST MOD 30 MIN: CPT | Performed by: STUDENT IN AN ORGANIZED HEALTH CARE EDUCATION/TRAINING PROGRAM

## 2024-12-19 PROCEDURE — 90673 RIV3 VACCINE NO PRESERV IM: CPT | Performed by: STUDENT IN AN ORGANIZED HEALTH CARE EDUCATION/TRAINING PROGRAM

## 2024-12-19 PROCEDURE — 3008F BODY MASS INDEX DOCD: CPT | Performed by: STUDENT IN AN ORGANIZED HEALTH CARE EDUCATION/TRAINING PROGRAM

## 2024-12-19 PROCEDURE — G0439 PPPS, SUBSEQ VISIT: HCPCS | Performed by: STUDENT IN AN ORGANIZED HEALTH CARE EDUCATION/TRAINING PROGRAM

## 2024-12-19 RX ORDER — METFORMIN HYDROCHLORIDE 1000 MG/1
1000 TABLET ORAL
Qty: 180 TABLET | Refills: 1 | Status: SHIPPED | OUTPATIENT
Start: 2024-12-19

## 2024-12-19 RX ORDER — QUETIAPINE FUMARATE 100 MG/1
100 TABLET, FILM COATED ORAL NIGHTLY
Qty: 90 TABLET | Refills: 1 | Status: SHIPPED | OUTPATIENT
Start: 2024-12-19

## 2024-12-19 NOTE — ASSESSMENT & PLAN NOTE
HbA1c up to 8.7%. We reviewed options for medication adjustment. Using shared decision making, we decided to keep Jardiance, increase metformin to 1000mg bid, and trial Ozempic. Medication dosing and side effects reviewed. Return precautions reviewed.   Orders:    semaglutide 0.25 mg or 0.5 mg (2 mg/3 mL) pen injector; Inject 0.25 mg under the skin 1 (one) time per week.    metFORMIN (Glucophage) 1,000 mg tablet; Take 1 tablet (1,000 mg) by mouth 2 times daily (morning and late afternoon).    Albumin-Creatinine Ratio, Urine Random; Future    CBC and Auto Differential; Future    Comprehensive Metabolic Panel; Future    Hemoglobin A1C; Future    Lipid Panel; Future    TSH with reflex to Free T4 if abnormal; Future    Vitamin B12; Future    Follow Up In Primary Care - Established; Future    blood-glucose meter misc; 1 each once daily.    blood sugar diagnostic (Blood Glucose Test) strip; 1 strip once daily.    lancets misc; 1 Lancet once daily.    lisinopril 5 mg tablet; Take 1 tablet (5 mg) by mouth once daily.    empagliflozin (Jardiance) 25 mg; Take 1 tablet (25 mg) by mouth once daily.

## 2024-12-19 NOTE — PROGRESS NOTES
Subjective   Reason for Visit: Usha Rowland is an 49 y.o. female here for a Medicare Wellness visit.     Past Medical, Surgical, and Family History reviewed and updated in chart.    Reviewed all medications by prescribing practitioner or clinical pharmacist (such as prescriptions, OTCs, herbal therapies and supplements) and documented in the medical record.    HPI  DM2 - HbA1c increased to 8.7%, she is currently managed on metformin 500mg big and Jardiance 25mg every day, there is no hx pancreatitis or fhx thyroid ca, eye check is scheduled at [facility] in 1/2025, states that she needs a new glucometer due to hers no longer working    Insomnia - seems to waking up earlier and earlier despite no other changes in her routine, she requests trial of increasing Seroquel, currently taking 75mg at bedtime    ADHD - currently enrolled in 4yr [employer] program, she is struggling significantly with focus and concentration, having a hard time completing assignments due to this, she was managed on Adderall for many years prior to moving to [place] and requests that we restart the stimulant, she tolerated without adverse effects while taking it, we did receive records confirming diagnosis and treatment prior to her move    Dysphagia - states that symptoms started about 1mo ago, feels like food sometimes gets stuck on the way down, type of food doesn't seem to matter, she denies heartburn/reflux symptoms, is currently managed on PPI and famotidine which she takes daily    Cervical Cancer Screening: she's not sure about last pap, no hx abnormal, s/p endometrial ablation x2, no bleeding since  Breast Cancer Screening: due 1/2025  Osteoporosis Screening: plan to start at age 67  Colon Cancer Screening: [facility]zar neg 5/10/2022, due 5/2025  Tobacco: 40 pack-yr hx, continues to smoke, working on weaning  Alcohol: rare  Recreational Drugs: hx marijuana use  Immunizations: utd, influenza today    Patient Care Team:  Ivania Gold,  "DO as PCP - General (Family Medicine)  Ivania Gold, DO as PCP - [place] [employer] PCP       Living Will: Not Received    Healthcare Power of Atty: Not Received     Review of Systems   Constitutional:  Negative for chills and fever.   Respiratory:  Negative for cough and shortness of breath.    Cardiovascular:  Negative for chest pain and palpitations.   Skin:  Negative for rash.   Psychiatric/Behavioral:  Positive for decreased concentration. Negative for dysphoric mood. The patient is not nervous/anxious.      Objective   Vitals:  /82   Pulse 80   Ht 1.702 m (5' 7\")   Wt 108 kg (238 lb 12.8 oz)   BMI 37.40 kg/m²       Physical Exam  Constitutional:       Appearance: Normal appearance.   HENT:      Head: Normocephalic and atraumatic.   Eyes:      General: No scleral icterus.     Conjunctiva/sclera: Conjunctivae normal.   Cardiovascular:      Rate and Rhythm: Normal rate and regular rhythm.      Heart sounds: No murmur heard.  Pulmonary:      Effort: Pulmonary effort is normal. No respiratory distress.      Breath sounds: Normal breath sounds.   Musculoskeletal:         General: Normal range of motion.      Cervical back: Normal range of motion and neck supple.   Skin:     General: Skin is warm and dry.      Findings: No rash.   Neurological:      General: No focal deficit present.      Mental Status: She is alert.   Psychiatric:         Mood and Affect: Mood normal.         Behavior: Behavior normal.       Assessment & Plan  Routine general medical examination at health care facility    Orders:    Follow Up In Primary Care - Established; Future    fluticasone (Flonase) 50 mcg/actuation nasal spray; Administer 1 spray into each nostril once daily. Shake gently. Before first use, prime pump. After use, clean tip and replace cap.    Type 2 diabetes mellitus without complication, without long-term current use of insulin (Multi)  HbA1c up to 8.7%. We reviewed options for medication adjustment. Using " shared decision making, we decided to keep Jardiance, increase metformin to 1000mg bid, and trial Ozempic. Medication dosing and side effects reviewed. Return precautions reviewed.   Orders:    semaglutide 0.25 mg or 0.5 mg (2 mg/3 mL) pen injector; Inject 0.25 mg under the skin 1 (one) time per week.    metFORMIN (Glucophage) 1,000 mg tablet; Take 1 tablet (1,000 mg) by mouth 2 times daily (morning and late afternoon).    Albumin-Creatinine Ratio, Urine Random; Future    CBC and Auto Differential; Future    Comprehensive Metabolic Panel; Future    Hemoglobin A1C; Future    Lipid Panel; Future    TSH with reflex to Free T4 if abnormal; Future    Vitamin B12; Future    Follow Up In Primary Care - Established; Future    blood-glucose meter misc; 1 each once daily.    blood sugar diagnostic (Blood Glucose Test) strip; 1 strip once daily.    lancets misc; 1 Lancet once daily.    lisinopril 5 mg tablet; Take 1 tablet (5 mg) by mouth once daily.    empagliflozin (Jardiance) 25 mg; Take 1 tablet (25 mg) by mouth once daily.    Encounter for screening mammogram for breast cancer    Orders:    BI mammo bilateral screening tomosynthesis; Future    Follow Up In Primary Care - Established; Future    Attention deficit hyperactivity disorder (ADHD), unspecified ADHD type  Significantly interfering with her schooling at this time. She did well with Adderall for many years in the past. Records were reviewed and support this. I am okay with restarting. Medication dosing and side effects reviewed.     I have personally reviewed the [place]AR[place] for this patient. I have considered the risk dependence, addiction, and diversion. There are no concerns at this time. I believe that it is clinically appropriate for this patient to be prescribed this medication based on documented diagnosis.   Orders:    Drug Screen, Urine With Reflex to Confirmation; Future    Follow Up In Primary Care - Established; Future    Drug Screen, Urine With Reflex to  Confirmation    THC (Marijuana), Urine, Confirmation    buPROPion XL (Wellbutrin XL) 150 mg 24 hr tablet; Take 1 tablet (150 mg) by mouth once daily in the morning. Do not crush, chew, or split.    On stimulant medication    Orders:    Drug Screen, Urine With Reflex to Confirmation; Future    Follow Up In Primary Care - Established; Future    Drug Screen, Urine With Reflex to Confirmation    THC (Marijuana), Urine, Confirmation    Encounter for immunization    Orders:    Flu vaccine, trivalent, preservative free, no egg protein, age 18y+ (Flublok)    Follow Up In Primary Care - Established; Future    Primary insomnia  Will try increasing Seroquel to 100mg at bedtime. Medication dosing and side effects reviewed.   Orders:    QUEtiapine (SEROquel) 100 mg tablet; Take 1 tablet (100 mg) by mouth once daily at bedtime.    Follow Up In Primary Care - Established; Future    Dyslipidemia  Continue statin and fibrate.    Orders:    rosuvastatin (Crestor) 20 mg tablet; Take 1 tablet (20 mg) by mouth once daily in the evening. Take with meals.    fenofibrate (Fenoglide) 40 mg tablet; Take 2 tablets (80 mg) by mouth once daily.    Diabetic polyneuropathy associated with type 2 diabetes mellitus (Multi)  Okay to continue Lyrica.    I have personally reviewed the OARRS for this patient. I have considered the risk dependence, addiction, and diversion. There are no concerns at this time. I believe that it is clinically appropriate for this patient to be prescribed this medication based on documented diagnosis.     Orders:    pregabalin (Lyrica) 300 mg capsule; Take 1 capsule (300 mg) by mouth 2 times a day.    Gastroesophageal reflux disease, unspecified whether esophagitis present  Will proceed with GI evaluation due to breakthrough symptoms despite the PPI and H2 blocker. Return precautions reviewed.     Orders:    Referral to Gastroenterology; Future    omeprazole (PriLOSEC) 40 mg DR capsule; Take 1 capsule (40 mg) by mouth once  daily in the morning. Do not crush or chew.    famotidine (Pepcid) 10 mg tablet; Take 1 tablet (10 mg) by mouth 2 times a day.    Cognitive decline  Stable.    Orders:    memantine (Namenda) 10 mg tablet; Take 1 tablet (10 mg) by mouth once daily.    Current mild episode of major depressive disorder without prior episode (CMS-HCC)  Stable.    Orders:    DULoxetine (Cymbalta) 60 mg DR capsule; Take 1 capsule (60 mg) by mouth once daily.    DULoxetine (Cymbalta) 30 mg DR capsule; Take 1 capsule (30 mg) by mouth once daily.    Cigarette nicotine dependence without complication  Encouraged complete cessation.    Orders:    buPROPion XL (Wellbutrin XL) 300 mg 24 hr tablet; Take 1 tablet (300 mg) by mouth once daily in the morning. Do not crush, chew, or split.    buPROPion XL (Wellbutrin XL) 150 mg 24 hr tablet; Take 1 tablet (150 mg) by mouth once daily in the morning. Do not crush, chew, or split.    albuterol 90 mcg/actuation inhaler; Inhale 2 puffs every 6 hours if needed for wheezing.    Anxiety  Stable.    Orders:    ARIPiprazole (Abilify) 15 mg tablet; Take 1 tablet (15 mg) by mouth once daily.    Primary hypertension    Orders:    dilTIAZem (Cardizem) 60 mg immediate release tablet; Take 1 tablet (60 mg) by mouth 2 times a day.    Follow up in 3mo for recheck, sooner if needed. Labs prior to appt.

## 2024-12-19 NOTE — ASSESSMENT & PLAN NOTE
Will try increasing Seroquel to 100mg at bedtime. Medication dosing and side effects reviewed.   Orders:    QUEtiapine (SEROquel) 100 mg tablet; Take 1 tablet (100 mg) by mouth once daily at bedtime.    Follow Up In Primary Care - Established; Future

## 2024-12-19 NOTE — ASSESSMENT & PLAN NOTE
Significantly interfering with her schooling at this time. She did well with Adderall for many years in the past. Records were reviewed and support this. I am okay with restarting. Medication dosing and side effects reviewed.     I have personally reviewed the OARRS for this patient. I have considered the risk dependence, addiction, and diversion. There are no concerns at this time. I believe that it is clinically appropriate for this patient to be prescribed this medication based on documented diagnosis.   Orders:    Drug Screen, Urine With Reflex to Confirmation; Future    Follow Up In Primary Care - Established; Future    Drug Screen, Urine With Reflex to Confirmation    THC (Marijuana), Urine, Confirmation    buPROPion XL (Wellbutrin XL) 150 mg 24 hr tablet; Take 1 tablet (150 mg) by mouth once daily in the morning. Do not crush, chew, or split.

## 2024-12-20 DIAGNOSIS — F90.9 ATTENTION DEFICIT HYPERACTIVITY DISORDER (ADHD), UNSPECIFIED ADHD TYPE: Primary | ICD-10-CM

## 2024-12-20 RX ORDER — LANCETS
1 EACH MISCELLANEOUS DAILY
COMMUNITY
End: 2024-12-20 | Stop reason: SDUPTHER

## 2024-12-20 RX ORDER — IBUPROFEN 200 MG
1 CAPSULE ORAL DAILY
COMMUNITY
End: 2024-12-20 | Stop reason: SDUPTHER

## 2024-12-20 RX ORDER — LISDEXAMFETAMINE DIMESYLATE 30 MG/1
30 CAPSULE ORAL EVERY MORNING
Qty: 30 CAPSULE | Refills: 0 | Status: SHIPPED | OUTPATIENT
Start: 2024-12-20 | End: 2024-12-20 | Stop reason: ENTERED-IN-ERROR

## 2024-12-20 RX ORDER — DEXTROAMPHETAMINE SACCHARATE, AMPHETAMINE ASPARTATE MONOHYDRATE, DEXTROAMPHETAMINE SULFATE AND AMPHETAMINE SULFATE 2.5; 2.5; 2.5; 2.5 MG/1; MG/1; MG/1; MG/1
10 CAPSULE, EXTENDED RELEASE ORAL EVERY MORNING
Qty: 30 CAPSULE | Refills: 0 | Status: SHIPPED | OUTPATIENT
Start: 2024-12-20 | End: 2025-01-19

## 2024-12-22 RX ORDER — BUPROPION HYDROCHLORIDE 300 MG/1
300 TABLET ORAL EVERY MORNING
Qty: 30 TABLET | Refills: 5 | Status: SHIPPED | OUTPATIENT
Start: 2024-12-22 | End: 2025-06-20

## 2024-12-22 RX ORDER — OMEPRAZOLE 40 MG/1
40 CAPSULE, DELAYED RELEASE ORAL EVERY MORNING
Qty: 30 CAPSULE | Refills: 5 | Status: SHIPPED | OUTPATIENT
Start: 2024-12-22

## 2024-12-22 RX ORDER — LANCETS
1 EACH MISCELLANEOUS DAILY
Qty: 100 EACH | Refills: 1 | Status: SHIPPED | OUTPATIENT
Start: 2024-12-22

## 2024-12-22 RX ORDER — MEMANTINE HYDROCHLORIDE 10 MG/1
10 TABLET ORAL DAILY
Qty: 30 TABLET | Refills: 5 | Status: SHIPPED | OUTPATIENT
Start: 2024-12-22 | End: 2025-12-22

## 2024-12-22 RX ORDER — BUPROPION HYDROCHLORIDE 150 MG/1
150 TABLET ORAL EVERY MORNING
Qty: 30 TABLET | Refills: 5 | Status: SHIPPED | OUTPATIENT
Start: 2024-12-22 | End: 2025-06-20

## 2024-12-22 RX ORDER — FAMOTIDINE 10 MG/1
10 TABLET ORAL 2 TIMES DAILY
Qty: 60 TABLET | Refills: 5 | Status: SHIPPED | OUTPATIENT
Start: 2024-12-22

## 2024-12-22 RX ORDER — DEXTROSE 4 G
1 TABLET,CHEWABLE ORAL DAILY
Qty: 1 EACH | Refills: 0 | Status: SHIPPED | OUTPATIENT
Start: 2024-12-22

## 2024-12-22 RX ORDER — DULOXETIN HYDROCHLORIDE 30 MG/1
30 CAPSULE, DELAYED RELEASE ORAL DAILY
Qty: 30 CAPSULE | Refills: 5 | Status: SHIPPED | OUTPATIENT
Start: 2024-12-22

## 2024-12-22 RX ORDER — LISINOPRIL 5 MG/1
5 TABLET ORAL DAILY
Qty: 30 TABLET | Refills: 5 | Status: SHIPPED | OUTPATIENT
Start: 2024-12-22

## 2024-12-22 RX ORDER — PREGABALIN 300 MG/1
300 CAPSULE ORAL 2 TIMES DAILY
Qty: 60 CAPSULE | Refills: 3 | Status: SHIPPED | OUTPATIENT
Start: 2024-12-22 | End: 2027-07-15

## 2024-12-22 RX ORDER — FLUTICASONE PROPIONATE 50 MCG
1 SPRAY, SUSPENSION (ML) NASAL DAILY
Qty: 16 G | Refills: 1 | Status: SHIPPED | OUTPATIENT
Start: 2024-12-22 | End: 2025-12-22

## 2024-12-22 RX ORDER — ALBUTEROL SULFATE 90 UG/1
2 INHALANT RESPIRATORY (INHALATION) EVERY 6 HOURS PRN
Qty: 18 G | Refills: 2 | Status: SHIPPED | OUTPATIENT
Start: 2024-12-22

## 2024-12-22 RX ORDER — ARIPIPRAZOLE 15 MG/1
15 TABLET ORAL DAILY
Qty: 30 TABLET | Refills: 5 | Status: SHIPPED | OUTPATIENT
Start: 2024-12-22

## 2024-12-22 RX ORDER — DILTIAZEM HYDROCHLORIDE 60 MG/1
60 TABLET, FILM COATED ORAL 2 TIMES DAILY
Qty: 60 TABLET | Refills: 5 | Status: SHIPPED | OUTPATIENT
Start: 2024-12-22

## 2024-12-22 RX ORDER — ROSUVASTATIN CALCIUM 20 MG/1
20 TABLET, COATED ORAL
Qty: 30 TABLET | Refills: 5 | Status: SHIPPED | OUTPATIENT
Start: 2024-12-22

## 2024-12-22 RX ORDER — DULOXETIN HYDROCHLORIDE 60 MG/1
60 CAPSULE, DELAYED RELEASE ORAL DAILY
Qty: 30 CAPSULE | Refills: 5 | Status: SHIPPED | OUTPATIENT
Start: 2024-12-22

## 2024-12-22 RX ORDER — IBUPROFEN 200 MG
1 CAPSULE ORAL DAILY
Qty: 100 EACH | Refills: 1 | Status: SHIPPED | OUTPATIENT
Start: 2024-12-22

## 2024-12-22 RX ORDER — FENOFIBRATE 40 MG/1
80 TABLET ORAL DAILY
Qty: 60 TABLET | Refills: 5 | Status: SHIPPED | OUTPATIENT
Start: 2024-12-22

## 2024-12-24 PROBLEM — H66.90 ACUTE OTITIS MEDIA: Status: RESOLVED | Noted: 2024-06-30 | Resolved: 2024-12-24

## 2024-12-24 PROBLEM — R10.84 GENERALIZED ABDOMINAL PAIN: Status: RESOLVED | Noted: 2024-03-24 | Resolved: 2024-12-24

## 2024-12-24 LAB — CARBOXYTHC UR-MCNC: 102 NG/ML

## 2024-12-24 ASSESSMENT — ACTIVITIES OF DAILY LIVING (ADL)
TAKING_MEDICATION: NEEDS ASSISTANCE
DRESSING: INDEPENDENT
GROCERY_SHOPPING: NEEDS ASSISTANCE
MANAGING_FINANCES: NEEDS ASSISTANCE
DOING_HOUSEWORK: NEEDS ASSISTANCE
BATHING: NEEDS ASSISTANCE

## 2024-12-24 ASSESSMENT — PATIENT HEALTH QUESTIONNAIRE - PHQ9
2. FEELING DOWN, DEPRESSED OR HOPELESS: NOT AT ALL
1. LITTLE INTEREST OR PLEASURE IN DOING THINGS: NOT AT ALL
SUM OF ALL RESPONSES TO PHQ9 QUESTIONS 1 AND 2: 0

## 2024-12-24 ASSESSMENT — ENCOUNTER SYMPTOMS
SHORTNESS OF BREATH: 0
CHILLS: 0
PALPITATIONS: 0
COUGH: 0
NERVOUS/ANXIOUS: 0
DYSPHORIC MOOD: 0
FEVER: 0
DECREASED CONCENTRATION: 1

## 2024-12-24 NOTE — ASSESSMENT & PLAN NOTE
Stable.    Orders:    DULoxetine (Cymbalta) 60 mg DR capsule; Take 1 capsule (60 mg) by mouth once daily.    DULoxetine (Cymbalta) 30 mg DR capsule; Take 1 capsule (30 mg) by mouth once daily.

## 2024-12-24 NOTE — ASSESSMENT & PLAN NOTE
Stable.    Orders:    ARIPiprazole (Abilify) 15 mg tablet; Take 1 tablet (15 mg) by mouth once daily.

## 2024-12-24 NOTE — ASSESSMENT & PLAN NOTE
Encouraged complete cessation.    Orders:    buPROPion XL (Wellbutrin XL) 300 mg 24 hr tablet; Take 1 tablet (300 mg) by mouth once daily in the morning. Do not crush, chew, or split.    buPROPion XL (Wellbutrin XL) 150 mg 24 hr tablet; Take 1 tablet (150 mg) by mouth once daily in the morning. Do not crush, chew, or split.    albuterol 90 mcg/actuation inhaler; Inhale 2 puffs every 6 hours if needed for wheezing.     operating room

## 2024-12-24 NOTE — ASSESSMENT & PLAN NOTE
Continue statin and fibrate.    Orders:    rosuvastatin (Crestor) 20 mg tablet; Take 1 tablet (20 mg) by mouth once daily in the evening. Take with meals.    fenofibrate (Fenoglide) 40 mg tablet; Take 2 tablets (80 mg) by mouth once daily.

## 2024-12-24 NOTE — ASSESSMENT & PLAN NOTE
Stable.    Orders:    memantine (Namenda) 10 mg tablet; Take 1 tablet (10 mg) by mouth once daily.

## 2024-12-24 NOTE — ASSESSMENT & PLAN NOTE
Will proceed with GI evaluation due to breakthrough symptoms despite the PPI and H2 blocker. Return precautions reviewed.     Orders:    Referral to Gastroenterology; Future    omeprazole (PriLOSEC) 40 mg DR capsule; Take 1 capsule (40 mg) by mouth once daily in the morning. Do not crush or chew.    famotidine (Pepcid) 10 mg tablet; Take 1 tablet (10 mg) by mouth 2 times a day.

## 2024-12-24 NOTE — ASSESSMENT & PLAN NOTE
Okay to continue Lyrica.    I have personally reviewed the OARRS for this patient. I have considered the risk dependence, addiction, and diversion. There are no concerns at this time. I believe that it is clinically appropriate for this patient to be prescribed this medication based on documented diagnosis.     Orders:    pregabalin (Lyrica) 300 mg capsule; Take 1 capsule (300 mg) by mouth 2 times a day.

## 2025-01-06 ENCOUNTER — APPOINTMENT (OUTPATIENT)
Dept: RADIOLOGY | Facility: CLINIC | Age: 50
End: 2025-01-06
Payer: MEDICARE

## 2025-01-13 ENCOUNTER — APPOINTMENT (OUTPATIENT)
Dept: RADIOLOGY | Facility: CLINIC | Age: 50
End: 2025-01-13
Payer: MEDICARE

## 2025-01-16 ENCOUNTER — PATIENT OUTREACH (OUTPATIENT)
Dept: PRIMARY CARE | Facility: CLINIC | Age: 50
End: 2025-01-16
Payer: MEDICARE

## 2025-01-16 NOTE — PROGRESS NOTES
Discharge Facility: [facility]  Discharge Diagnosis: Acute Encephalopathy   Admission Date: 1/7/25  Discharge Date: 1/15/25    PCP Appointment Date: None available. Task to office  Specialist Appointment Date: Unknown  Hospital Encounter and Summary Linked: Yes    See discharge assessment below for further details    Engagement  Call Start Time: 1315 (spoke with patients spouse) (1/16/2025  1:17 PM)    Medications  Medications reviewed with patient/caregiver?: Not applicable (1/16/2025  1:17 PM)  Does the patient have all medications ordered at discharge?: Not applicable (1/16/2025  1:17 PM)  Care Management Interventions: No intervention needed (1/16/2025  1:17 PM)  Prescription Comments: N/A (1/16/2025  1:17 PM)  Is the patient taking all medications as directed (includes completed medication regime)?: Not applicable (1/16/2025  1:17 PM)  Medication Comments: n/a (1/16/2025  1:17 PM)    Appointments  Does the patient have a primary care provider?: Yes (no available appts. task to office) (1/16/2025  1:17 PM)  Care Management Interventions: Educated patient on importance of making appointment (1/16/2025  1:17 PM)  Has the patient kept scheduled appointments due by today?: Yes (1/16/2025  1:17 PM)  Care Management Interventions: Educated on importance of keeping appointment (1/16/2025  1:17 PM)    Self Management  What is the home health agency?: n/a (1/16/2025  1:17 PM)  Has home health visited the patient within 72 hours of discharge?: Not applicable (1/16/2025  1:17 PM)    Patient Teaching  Does the patient have access to their discharge instructions?: Yes (1/16/2025  1:17 PM)  Care Management Interventions: Reviewed instructions with patient (1/16/2025  1:17 PM)  What is the patient's perception of their health status since discharge?: Same (1/16/2025  1:17 PM)  Is the patient/caregiver able to teach back the hierarchy of who to call/visit for symptoms/problems? PCP, Specialist, Home Health nurse, Urgent Care,  ED, 911: Yes (1/16/2025  1:17 PM)  Patient/Caregiver Education Comments: This CM spoke with pts spouse via phone. Spouse reports pt doing well at home since discharge. New meds reviewed. Spouse reports they have picked up all new meds and have no questions at this time. Spouse aware of my availability for non-emergent concerns. Contact info provided. (1/16/2025  1:17 PM)      Landon Grijalva LPN

## 2025-01-20 ENCOUNTER — HOSPITAL ENCOUNTER (EMERGENCY)
Facility: HOSPITAL | Age: 50
Discharge: HOME | End: 2025-01-20
Attending: EMERGENCY MEDICINE
Payer: MEDICARE

## 2025-01-20 VITALS
HEART RATE: 100 BPM | SYSTOLIC BLOOD PRESSURE: 125 MMHG | OXYGEN SATURATION: 94 % | DIASTOLIC BLOOD PRESSURE: 87 MMHG | HEIGHT: 67 IN | WEIGHT: 219 LBS | TEMPERATURE: 97 F | RESPIRATION RATE: 16 BRPM | BODY MASS INDEX: 34.37 KG/M2

## 2025-01-20 DIAGNOSIS — A49.9 UTI (URINARY TRACT INFECTION), BACTERIAL: Primary | ICD-10-CM

## 2025-01-20 DIAGNOSIS — N39.0 UTI (URINARY TRACT INFECTION), BACTERIAL: Primary | ICD-10-CM

## 2025-01-20 LAB
APPEARANCE UR: CLEAR
BILIRUB UR STRIP.AUTO-MCNC: ABNORMAL MG/DL
C DIF TOX TCDA+TCDB STL QL NAA+PROBE: NOT DETECTED
C TRACH RRNA SPEC QL NAA+PROBE: NOT DETECTED
COLOR UR: ABNORMAL
GLUCOSE UR STRIP.AUTO-MCNC: ABNORMAL MG/DL
HOLD SPECIMEN: NORMAL
KETONES UR STRIP.AUTO-MCNC: NEGATIVE MG/DL
LEUKOCYTE ESTERASE UR QL STRIP.AUTO: ABNORMAL
MUCOUS THREADS #/AREA URNS AUTO: NORMAL /LPF
N GONORRHOEA DNA SPEC QL PROBE+SIG AMP: NOT DETECTED
NITRITE UR QL STRIP.AUTO: NEGATIVE
PH UR STRIP.AUTO: 5.5 [PH]
PROT UR STRIP.AUTO-MCNC: NEGATIVE MG/DL
RBC # UR STRIP.AUTO: NEGATIVE /UL
RBC #/AREA URNS AUTO: NORMAL /HPF
SP GR UR STRIP.AUTO: 1.03
SQUAMOUS #/AREA URNS AUTO: NORMAL /HPF
UROBILINOGEN UR STRIP.AUTO-MCNC: NORMAL MG/DL
WBC #/AREA URNS AUTO: NORMAL /HPF

## 2025-01-20 PROCEDURE — 81001 URINALYSIS AUTO W/SCOPE: CPT | Performed by: EMERGENCY MEDICINE

## 2025-01-20 PROCEDURE — 87086 URINE CULTURE/COLONY COUNT: CPT | Mod: SAMLAB | Performed by: EMERGENCY MEDICINE

## 2025-01-20 PROCEDURE — 87493 C DIFF AMPLIFIED PROBE: CPT | Performed by: EMERGENCY MEDICINE

## 2025-01-20 PROCEDURE — 87491 CHLMYD TRACH DNA AMP PROBE: CPT | Performed by: EMERGENCY MEDICINE

## 2025-01-20 PROCEDURE — 99283 EMERGENCY DEPT VISIT LOW MDM: CPT | Performed by: EMERGENCY MEDICINE

## 2025-01-20 PROCEDURE — 2500000002 HC RX 250 W HCPCS SELF ADMINISTERED DRUGS (ALT 637 FOR MEDICARE OP, ALT 636 FOR OP/ED): Mod: MUE | Performed by: EMERGENCY MEDICINE

## 2025-01-20 RX ORDER — NITROFURANTOIN 25; 75 MG/1; MG/1
100 CAPSULE ORAL 2 TIMES DAILY
Qty: 14 CAPSULE | Refills: 0 | Status: SHIPPED | OUTPATIENT
Start: 2025-01-20 | End: 2025-01-27

## 2025-01-20 RX ORDER — NITROFURANTOIN 25; 75 MG/1; MG/1
100 CAPSULE ORAL EVERY 12 HOURS SCHEDULED
Status: DISCONTINUED | OUTPATIENT
Start: 2025-01-20 | End: 2025-01-20 | Stop reason: HOSPADM

## 2025-01-20 RX ADMIN — NITROFURANTOIN MONOHYDRATE/MACROCRYSTALS 100 MG: 25; 75 CAPSULE ORAL at 02:27

## 2025-01-20 ASSESSMENT — PAIN - FUNCTIONAL ASSESSMENT: PAIN_FUNCTIONAL_ASSESSMENT: 0-10

## 2025-01-20 ASSESSMENT — COLUMBIA-SUICIDE SEVERITY RATING SCALE - C-SSRS
2. HAVE YOU ACTUALLY HAD ANY THOUGHTS OF KILLING YOURSELF?: NO
1. IN THE PAST MONTH, HAVE YOU WISHED YOU WERE DEAD OR WISHED YOU COULD GO TO SLEEP AND NOT WAKE UP?: NO
6. HAVE YOU EVER DONE ANYTHING, STARTED TO DO ANYTHING, OR PREPARED TO DO ANYTHING TO END YOUR LIFE?: NO

## 2025-01-20 ASSESSMENT — PAIN DESCRIPTION - LOCATION: LOCATION: ABDOMEN

## 2025-01-20 ASSESSMENT — PAIN SCALES - GENERAL: PAINLEVEL_OUTOF10: 8

## 2025-01-20 ASSESSMENT — PAIN DESCRIPTION - PAIN TYPE: TYPE: ACUTE PAIN

## 2025-01-20 NOTE — ED PROVIDER NOTES
HPI   Chief Complaint   Patient presents with    Multiple Complaints       49-year-old female presents with recurrent dysuria.  Patient states she has had dysuria for the last 2 to 3 days.  Patient gives me a history of recently being admitted to [place] and treated for UTI and other medical ailments.  Patient states she was in the hospital for over a week.  Patient denies any fever, chills, nausea or vomiting.  I did go over some of the results with the patient.  I indicated the STD check with take hours.  Patient be given Macrobid and discharged.      History provided by:  Patient          Patient History   No past medical history on file.  Past Surgical History:   Procedure Laterality Date    CHOLECYSTECTOMY      ENDOMETRIAL ABLATION      INNER EAR SURGERY      Ear drum     No family history on file.  Social History     Tobacco Use    Smoking status: Every Day     Current packs/day: 1.00     Types: Cigarettes    Smokeless tobacco: Never   Vaping Use    Vaping status: Never Used   Substance Use Topics    Alcohol use: Not Currently    Drug use: Yes     Types: Marijuana     Comment: Gummies       Physical Exam   ED Triage Vitals [01/20/25 0039]   Temperature Heart Rate Respirations BP   36.1 °C (97 °F) (!) 105 18 (!) 132/96      Pulse Ox Temp Source Heart Rate Source Patient Position   95 % Temporal Monitor --      BP Location FiO2 (%)     -- --       Physical Exam  Vitals and nursing note reviewed.   Constitutional:       General: She is not in acute distress.     Appearance: She is well-developed. She is obese.   HENT:      Head: Normocephalic and atraumatic.   Eyes:      Conjunctiva/sclera: Conjunctivae normal.   Cardiovascular:      Rate and Rhythm: Normal rate and regular rhythm.      Heart sounds: No murmur heard.  Pulmonary:      Effort: Pulmonary effort is normal. No respiratory distress.      Breath sounds: Normal breath sounds.   Abdominal:      Palpations: Abdomen is soft.      Tenderness: There is no  abdominal tenderness.   Musculoskeletal:         General: No swelling.      Cervical back: Neck supple.   Skin:     General: Skin is warm and dry.      Capillary Refill: Capillary refill takes less than 2 seconds.   Neurological:      Mental Status: She is alert.   Psychiatric:         Mood and Affect: Mood normal.       Labs Reviewed   URINALYSIS WITH REFLEX CULTURE AND MICROSCOPIC - Abnormal       Result Value    Color, Urine Light-Yellow      Appearance, Urine Clear      Specific Gravity, Urine 1.031      pH, Urine 5.5      Protein, Urine NEGATIVE      Glucose, Urine OVER (4+) (*)     Blood, Urine NEGATIVE      Ketones, Urine NEGATIVE      Bilirubin, Urine 2 (2+) (*)     Urobilinogen, Urine Normal      Nitrite, Urine NEGATIVE      Leukocyte Esterase, Urine 25 Perfecto/uL (*)     Narrative:     OVER is reported when the result is greater than the clinically reportable range.   C. DIFFICILE, PCR   URINE CULTURE   MICROSCOPIC ONLY, URINE    WBC, Urine NONE      RBC, Urine NONE      Squamous Epithelial Cells, Urine 1-9 (SPARSE)      Mucus, Urine FEW     URINALYSIS WITH REFLEX CULTURE AND MICROSCOPIC    Narrative:     The following orders were created for panel order Urinalysis with Reflex Culture and Microscopic.  Procedure                               Abnormality         Status                     ---------                               -----------         ------                     Urinalysis with Reflex C...[[id number]]  Abnormal            Final result               Extra Urine Gray Tube[[id number]]                            In process                   Please view results for these tests on the individual orders.   EXTRA URINE GRAY TUBE   C. TRACHOMATIS + N. GONORRHOEAE, AMPLIFIED      ED Course & MDM   Diagnoses as of 01/20/25 0213   UTI (urinary tract infection), bacterial                 No data recorded     Dee Coma Scale Score: 15 (01/20/25 0040 : Clay Pedro RN)                           Medical Decision  Making  1 increase fluids 2 take medication as prescribed 3 resume home medication 4 follow-up with family medical doctor in 2 to 3 days if worse return to ED        Procedure  Procedures     Ozzie Wells DO  01/20/25 7363

## 2025-01-21 LAB — BACTERIA UR CULT: NORMAL

## 2025-01-22 ENCOUNTER — APPOINTMENT (OUTPATIENT)
Dept: PRIMARY CARE | Facility: CLINIC | Age: 50
End: 2025-01-22
Payer: MEDICARE

## 2025-01-23 ENCOUNTER — OFFICE VISIT (OUTPATIENT)
Dept: PRIMARY CARE | Facility: CLINIC | Age: 50
End: 2025-01-23
Payer: MEDICARE

## 2025-01-23 ENCOUNTER — HOSPITAL ENCOUNTER (OUTPATIENT)
Dept: RADIOLOGY | Facility: CLINIC | Age: 50
Discharge: HOME | End: 2025-01-23
Payer: MEDICARE

## 2025-01-23 VITALS — WEIGHT: 225.6 LBS | HEIGHT: 67 IN | BODY MASS INDEX: 35.41 KG/M2

## 2025-01-23 VITALS
HEIGHT: 67 IN | SYSTOLIC BLOOD PRESSURE: 112 MMHG | BODY MASS INDEX: 35.41 KG/M2 | HEART RATE: 84 BPM | DIASTOLIC BLOOD PRESSURE: 70 MMHG | WEIGHT: 225.6 LBS

## 2025-01-23 DIAGNOSIS — F41.9 ANXIETY: ICD-10-CM

## 2025-01-23 DIAGNOSIS — Z12.31 ENCOUNTER FOR SCREENING MAMMOGRAM FOR BREAST CANCER: ICD-10-CM

## 2025-01-23 DIAGNOSIS — E11.9 TYPE 2 DIABETES MELLITUS WITHOUT COMPLICATION, WITHOUT LONG-TERM CURRENT USE OF INSULIN (MULTI): Primary | ICD-10-CM

## 2025-01-23 PROCEDURE — 3074F SYST BP LT 130 MM HG: CPT | Performed by: STUDENT IN AN ORGANIZED HEALTH CARE EDUCATION/TRAINING PROGRAM

## 2025-01-23 PROCEDURE — 77067 SCR MAMMO BI INCL CAD: CPT | Performed by: RADIOLOGY

## 2025-01-23 PROCEDURE — 4010F ACE/ARB THERAPY RXD/TAKEN: CPT | Performed by: STUDENT IN AN ORGANIZED HEALTH CARE EDUCATION/TRAINING PROGRAM

## 2025-01-23 PROCEDURE — 77067 SCR MAMMO BI INCL CAD: CPT

## 2025-01-23 PROCEDURE — 3078F DIAST BP <80 MM HG: CPT | Performed by: STUDENT IN AN ORGANIZED HEALTH CARE EDUCATION/TRAINING PROGRAM

## 2025-01-23 PROCEDURE — 77063 BREAST TOMOSYNTHESIS BI: CPT | Performed by: RADIOLOGY

## 2025-01-23 PROCEDURE — 3008F BODY MASS INDEX DOCD: CPT | Performed by: STUDENT IN AN ORGANIZED HEALTH CARE EDUCATION/TRAINING PROGRAM

## 2025-01-23 PROCEDURE — 99495 TRANSJ CARE MGMT MOD F2F 14D: CPT | Performed by: STUDENT IN AN ORGANIZED HEALTH CARE EDUCATION/TRAINING PROGRAM

## 2025-01-23 RX ORDER — ARIPIPRAZOLE 10 MG/1
10 TABLET ORAL DAILY
Qty: 30 TABLET | Refills: 1 | Status: SHIPPED | OUTPATIENT
Start: 2025-01-23

## 2025-01-23 NOTE — PROGRESS NOTES
"Subjective   Patient ID: Usha Rowland is a 49 y.o. female who presents for hospital follow up. Was in ER this morning for blood sugar being 488    HPI    Felt out of it, out of it a couple days  Back to normal since she's been home  Stopped Wellbutrin  Adderall on school days  Liking Cymbalta      Felt off this morning bg 488 around midnight, was down to 200ish    Will start decreasing abilify    Will increase to 0.5mg ozempic    Review of Systems  Objective   /70   Pulse 84   Ht 1.702 m (5' 7\")   Wt 102 kg (225 lb 9.6 oz)   BMI 35.33 kg/m²     Physical Exam  Assessment/Plan   {Assess/PlanSmartLinks:41301}     " "Pulse 84   Ht 1.702 m (5' 7\")   Wt 102 kg (225 lb 9.6 oz)   BMI 35.33 kg/m²     Physical Exam  Constitutional:       Appearance: Normal appearance.   HENT:      Head: Normocephalic and atraumatic.   Eyes:      General: No scleral icterus.     Conjunctiva/sclera: Conjunctivae normal.   Cardiovascular:      Rate and Rhythm: Normal rate and regular rhythm.      Heart sounds: No murmur heard.  Pulmonary:      Effort: Pulmonary effort is normal. No respiratory distress.      Breath sounds: Normal breath sounds.   Musculoskeletal:         General: No swelling. Normal range of motion.      Cervical back: Normal range of motion and neck supple.   Skin:     General: Skin is warm and dry.      Findings: No rash.   Neurological:      General: No focal deficit present.      Mental Status: She is alert.   Psychiatric:         Mood and Affect: Mood normal.         Behavior: Behavior normal.       The complexity of medical decision making for this patient's transitional care is moderate.    Assessment/Plan   Problem List Items Addressed This Visit             ICD-10-CM    Type 2 diabetes mellitus without complication, without long-term current use of insulin (Multi) - Primary E11.9     Lifestyle modifications reviewed. Will keep metformin and Jardiance and will increase Ozempic to 0.5mg qwk. Medication dosing and side effects reviewed. Return precautions reviewed.          Anxiety F41.9     Agree with psychiatric polypharmacy as contributor to her AMS. She was taking high doses of multiple psychiatric medications when she moved to [place]. She is agreeable to gradual wean of the medications. Wellbutrin was discontinued during hospitalization. We will start by decreasing Abilify from 15 to 10mg every day (as this medication can also be contributing to elevated BG and cholesterol). Medication dosing and side effects reviewed.          Relevant Medications    ARIPiprazole (Abilify) 10 mg tablet    Other Relevant Orders    Follow Up " In Primary Care - Established   Follow up in 1mo for further medication down titration, sooner if needed.

## 2025-01-28 DIAGNOSIS — F90.9 ATTENTION DEFICIT HYPERACTIVITY DISORDER (ADHD), UNSPECIFIED ADHD TYPE: ICD-10-CM

## 2025-01-28 DIAGNOSIS — R19.7 DIARRHEA, UNSPECIFIED TYPE: ICD-10-CM

## 2025-01-28 DIAGNOSIS — E11.9 TYPE 2 DIABETES MELLITUS WITHOUT COMPLICATION, WITHOUT LONG-TERM CURRENT USE OF INSULIN (MULTI): ICD-10-CM

## 2025-01-28 RX ORDER — FLASH GLUCOSE SCANNING READER
EACH MISCELLANEOUS
Qty: 1 EACH | Refills: 0 | Status: SHIPPED | OUTPATIENT
Start: 2025-01-28

## 2025-01-28 RX ORDER — DICYCLOMINE HYDROCHLORIDE 10 MG/1
CAPSULE ORAL
Qty: 60 CAPSULE | Refills: 2 | Status: SHIPPED | OUTPATIENT
Start: 2025-01-28

## 2025-01-28 RX ORDER — DEXTROAMPHETAMINE SACCHARATE, AMPHETAMINE ASPARTATE MONOHYDRATE, DEXTROAMPHETAMINE SULFATE AND AMPHETAMINE SULFATE 2.5; 2.5; 2.5; 2.5 MG/1; MG/1; MG/1; MG/1
10 CAPSULE, EXTENDED RELEASE ORAL EVERY MORNING
Qty: 30 CAPSULE | Refills: 0 | Status: SHIPPED | OUTPATIENT
Start: 2025-01-28 | End: 2025-02-27

## 2025-01-28 RX ORDER — FLASH GLUCOSE SENSOR
KIT MISCELLANEOUS
Qty: 6 EACH | Refills: 1 | Status: SHIPPED | OUTPATIENT
Start: 2025-01-28

## 2025-01-30 ENCOUNTER — HOSPITAL ENCOUNTER (OUTPATIENT)
Dept: RADIOLOGY | Facility: CLINIC | Age: 50
Discharge: HOME | End: 2025-01-30
Payer: MEDICARE

## 2025-01-30 DIAGNOSIS — R10.2 PELVIC PAIN IN FEMALE: Primary | ICD-10-CM

## 2025-01-30 DIAGNOSIS — R10.2 PELVIC PAIN IN FEMALE: ICD-10-CM

## 2025-01-30 PROCEDURE — 76856 US EXAM PELVIC COMPLETE: CPT

## 2025-01-31 ENCOUNTER — PATIENT OUTREACH (OUTPATIENT)
Dept: PRIMARY CARE | Facility: CLINIC | Age: 50
End: 2025-01-31
Payer: MEDICARE

## 2025-01-31 NOTE — PROGRESS NOTES
Call regarding appt. with PCP on (1/23/25) after hospitalization.  At time of outreach call the patient feels as if their condition has (improved) since last visit.  Reviewed the PCP appointment with the pt and addressed any questions or concerns.    Landon Grijalva LPN

## 2025-02-06 DIAGNOSIS — G89.29 CHRONIC BACK PAIN, UNSPECIFIED BACK LOCATION, UNSPECIFIED BACK PAIN LATERALITY: ICD-10-CM

## 2025-02-06 DIAGNOSIS — M54.9 CHRONIC BACK PAIN, UNSPECIFIED BACK LOCATION, UNSPECIFIED BACK PAIN LATERALITY: ICD-10-CM

## 2025-02-06 RX ORDER — TIZANIDINE 2 MG/1
TABLET ORAL
Qty: 90 TABLET | Refills: 3 | Status: SHIPPED | OUTPATIENT
Start: 2025-02-06

## 2025-02-13 ENCOUNTER — PATIENT OUTREACH (OUTPATIENT)
Dept: PRIMARY CARE | Facility: CLINIC | Age: 50
End: 2025-02-13
Payer: MEDICARE

## 2025-02-13 NOTE — PROGRESS NOTES
Successful outreach to patient regarding hospitalization as patient continues TCM program.   At time of outreach call the patient feels as if their condition has (improved) since initial visit with PCP or specialist.  Questions or concerns addressed at this time with patient.   Provided contact information to patient if any further non-emergent needs arise.     Landon Grijalva LPN

## 2025-02-19 ASSESSMENT — ENCOUNTER SYMPTOMS
FEVER: 0
NERVOUS/ANXIOUS: 0
CHILLS: 0
PALPITATIONS: 0
SHORTNESS OF BREATH: 0
COUGH: 0
DYSPHORIC MOOD: 0

## 2025-02-19 NOTE — ASSESSMENT & PLAN NOTE
Agree with psychiatric polypharmacy as contributor to her AMS. She was taking high doses of multiple psychiatric medications when she moved to [place]. She is agreeable to gradual wean of the medications. Wellbutrin was discontinued during hospitalization. We will start by decreasing Abilify from 15 to 10mg every day (as this medication can also be contributing to elevated BG and cholesterol). Medication dosing and side effects reviewed.

## 2025-02-19 NOTE — ASSESSMENT & PLAN NOTE
Lifestyle modifications reviewed. Will keep metformin and Jardiance and will increase Ozempic to 0.5mg qwk. Medication dosing and side effects reviewed. Return precautions reviewed.

## 2025-02-27 ENCOUNTER — APPOINTMENT (OUTPATIENT)
Dept: PRIMARY CARE | Facility: CLINIC | Age: 50
End: 2025-02-27
Payer: MEDICARE

## 2025-03-03 DIAGNOSIS — F41.9 ANXIETY: ICD-10-CM

## 2025-03-04 DIAGNOSIS — E11.9 TYPE 2 DIABETES MELLITUS WITHOUT COMPLICATION, WITHOUT LONG-TERM CURRENT USE OF INSULIN (MULTI): ICD-10-CM

## 2025-03-04 DIAGNOSIS — F90.9 ATTENTION DEFICIT HYPERACTIVITY DISORDER (ADHD), UNSPECIFIED ADHD TYPE: ICD-10-CM

## 2025-03-05 RX ORDER — DEXTROAMPHETAMINE SACCHARATE, AMPHETAMINE ASPARTATE MONOHYDRATE, DEXTROAMPHETAMINE SULFATE AND AMPHETAMINE SULFATE 2.5; 2.5; 2.5; 2.5 MG/1; MG/1; MG/1; MG/1
10 CAPSULE, EXTENDED RELEASE ORAL EVERY MORNING
Qty: 30 CAPSULE | Refills: 0 | Status: SHIPPED | OUTPATIENT
Start: 2025-03-05 | End: 2025-04-04

## 2025-03-13 ENCOUNTER — OFFICE VISIT (OUTPATIENT)
Dept: URGENT CARE | Facility: CLINIC | Age: 50
End: 2025-03-13
Payer: MEDICARE

## 2025-03-13 ENCOUNTER — HOSPITAL ENCOUNTER (OUTPATIENT)
Dept: RADIOLOGY | Facility: CLINIC | Age: 50
Discharge: HOME | End: 2025-03-13
Payer: MEDICARE

## 2025-03-13 VITALS
RESPIRATION RATE: 14 BRPM | OXYGEN SATURATION: 96 % | TEMPERATURE: 98 F | DIASTOLIC BLOOD PRESSURE: 78 MMHG | SYSTOLIC BLOOD PRESSURE: 121 MMHG | HEART RATE: 90 BPM

## 2025-03-13 DIAGNOSIS — R10.2 PELVIC PAIN: ICD-10-CM

## 2025-03-13 DIAGNOSIS — N30.00 ACUTE CYSTITIS WITHOUT HEMATURIA: ICD-10-CM

## 2025-03-13 DIAGNOSIS — N83.202 LEFT OVARIAN CYST: ICD-10-CM

## 2025-03-13 DIAGNOSIS — N83.202 LEFT OVARIAN CYST: Primary | ICD-10-CM

## 2025-03-13 PROCEDURE — 76830 TRANSVAGINAL US NON-OB: CPT | Performed by: RADIOLOGY

## 2025-03-13 PROCEDURE — 76856 US EXAM PELVIC COMPLETE: CPT | Performed by: RADIOLOGY

## 2025-03-13 PROCEDURE — 99213 OFFICE O/P EST LOW 20 MIN: CPT | Mod: 25 | Performed by: PHYSICIAN ASSISTANT

## 2025-03-13 PROCEDURE — 76830 TRANSVAGINAL US NON-OB: CPT

## 2025-03-13 RX ORDER — HYDROCODONE BITARTRATE AND ACETAMINOPHEN 5; 325 MG/1; MG/1
1 TABLET ORAL EVERY 6 HOURS
Qty: 12 TABLET | Refills: 0 | Status: SHIPPED | OUTPATIENT
Start: 2025-03-13 | End: 2025-03-16

## 2025-03-13 RX ORDER — CIPROFLOXACIN 250 MG/1
250 TABLET, FILM COATED ORAL 2 TIMES DAILY
Qty: 14 TABLET | Refills: 0 | Status: SHIPPED | OUTPATIENT
Start: 2025-03-13 | End: 2025-03-20

## 2025-03-13 RX ORDER — NAPROXEN 500 MG/1
500 TABLET ORAL
Qty: 14 TABLET | Refills: 0 | Status: SHIPPED | OUTPATIENT
Start: 2025-03-13 | End: 2025-03-20

## 2025-03-13 NOTE — PROGRESS NOTES
"[facility] NOTE:      Name: Usha Rowland, 50 y.o.    CSN:[id number]   MRN:[id number]    PCP: Ivania Gold, DO    ALL:    Allergies   Allergen Reactions    Ammonia Rash    Metoclopramide Hives, Rash and Unknown     Feels like skin is crawling   Feels like skin is crawling    Feels like skin is crawling  REGLAN  Feels like skin is crawling   Feels like skin is crawling   Feels like skin is crawling   Feels like skin is crawling    Dog Dander Rash    Horse Dander Rash    Latex Rash     Noted when wearing gloves. Latex testing done 03/17/10    Noted when wearing gloves. Latex testing done 03/17/10   Noted when wearing gloves. Latex testing done 03/17/10       History:    Chief Complaint: Abdominal Pain (Recently diagnosed with ovarian cyst x 2 months, back pain x 1 day)    Encounter Date: 3/13/2025  1:54 PM      HPI: The history was obtained from the patient. Usha is a 50 y.o. female, who presents with a chief complaint of LLQ abdominal pain accompanied by N/V and \"hot flashes\" x1 week. Pain is severe, colicky, and untouched by ibuprofen and Tylenol. Patient feels slight improvement when lying on her right side. She is currently taking Zofran for nausea.     She also developed new bilateral flank pain yesterday. She admits to urinary frequency but denies dysuria, hematuria, and urgency. She denies all other symptoms. Patient has been experiencing similar pain every few days for multiple months.     She has known IBS and was diagnosed with ovarian cyst 2 months ago. No obvious stooling changes.       PMHx:    No past medical history on file.           Current Outpatient Medications   Medication Sig Dispense Refill    albuterol 90 mcg/actuation inhaler Inhale 2 puffs every 6 hours if needed for wheezing. 18 g 2    amphetamine-dextroamphetamine XR (Adderall XR) 10 mg 24 hr capsule Take 1 capsule (10 mg) by mouth once daily in the morning. Do not crush or chew. 30 capsule 0    " ARIPiprazole (Abilify) 10 mg tablet Take 1 tablet (10 mg) by mouth once daily. 30 tablet 1    aspirin-acetaminophen-caffeine (Excedrin Migraine) 250-250-65 mg tablet Take 1 tablet by mouth every 6 hours if needed for headaches.      blood sugar diagnostic (Blood Glucose Test) strip 1 strip once daily. 100 each 1    blood-glucose meter misc 1 each once daily. 1 each 0    dicyclomine (Bentyl) 10 mg capsule TAKE ONE CAPSULE BY MOUTH TWICE DAILY AS NEEDED FOR ABDOMINAL PAIN OR CRAMPS 60 capsule 2    dilTIAZem (Cardizem) 60 mg immediate release tablet Take 1 tablet (60 mg) by mouth 2 times a day. 60 tablet 5    DULoxetine (Cymbalta) 30 mg DR capsule Take 1 capsule (30 mg) by mouth once daily. 30 capsule 5    DULoxetine (Cymbalta) 60 mg DR capsule Take 1 capsule (60 mg) by mouth once daily. 30 capsule 5    empagliflozin (Jardiance) 25 mg Take 1 tablet (25 mg) by mouth once daily. 30 tablet 5    famotidine (Pepcid) 10 mg tablet Take 1 tablet (10 mg) by mouth 2 times a day. 60 tablet 5    fenofibrate (Fenoglide) 40 mg tablet Take 2 tablets (80 mg) by mouth once daily. 60 tablet 5    flash glucose scanning reader (FreeStyle Elvin 2 Melbeta) misc Use as instructed 1 each 0    flash glucose sensor kit (FreeStyle Elvin 2 Sensor) kit Change every 14 days 6 each 1    fluticasone (Flonase) 50 mcg/actuation nasal spray Administer 1 spray into each nostril once daily. Shake gently. Before first use, prime pump. After use, clean tip and replace cap. 16 g 1    lancets misc 1 Lancet once daily. 100 each 1    lisinopril 5 mg tablet Take 1 tablet (5 mg) by mouth once daily. 30 tablet 5    loperamide (Imodium) 2 mg capsule Take 1 capsule (2 mg) by mouth 4 times a day as needed for diarrhea. 30 capsule 2    memantine (Namenda) 10 mg tablet Take 1 tablet (10 mg) by mouth once daily. 30 tablet 5    metFORMIN (Glucophage) 1,000 mg tablet Take 1 tablet (1,000 mg) by mouth 2 times daily (morning and late afternoon). 180 tablet 1    omeprazole  (PriLOSEC) 40 mg DR capsule Take 1 capsule (40 mg) by mouth once daily in the morning. Do not crush or chew. 30 capsule 5    pregabalin (Lyrica) 300 mg capsule Take 1 capsule (300 mg) by mouth 2 times a day. 60 capsule 3    QUEtiapine (SEROquel) 100 mg tablet Take 1 tablet (100 mg) by mouth once daily at bedtime. 90 tablet 1    rosuvastatin (Crestor) 20 mg tablet Take 1 tablet (20 mg) by mouth once daily in the evening. Take with meals. 30 tablet 5    semaglutide 0.25 mg or 0.5 mg (2 mg/3 mL) pen injector Inject 0.25 mg under the skin 1 (one) time per week. 2 mL 3    tiZANidine (Zanaflex) 2 mg tablet TAKE ONE TABLET BY MOUTH EVERY MORNING AND TAKE TWO TABLETS BY MOUTH AT BEDTIME 90 tablet 3    amitriptyline (Elavil) 10 mg tablet Take 1 tablet (10 mg) by mouth once daily at bedtime. 90 tablet 1     No current facility-administered medications for this visit.         PMSx:    Past Surgical History:   Procedure Laterality Date    CHOLECYSTECTOMY      ENDOMETRIAL ABLATION      INNER EAR SURGERY      Ear drum       Fam Hx: No family history on file.    SOC. Hx:     Social History     Socioeconomic History    Marital status: Single     Spouse name: Not on file    Number of children: Not on file    Years of education: Not on file    Highest education level: Not on file   Occupational History    Not on file   Tobacco Use    Smoking status: Every Day     Current packs/day: 1.00     Types: Cigarettes    Smokeless tobacco: Never   Vaping Use    Vaping status: Never Used   Substance and Sexual Activity    Alcohol use: Not Currently    Drug use: Yes     Types: Marijuana     Comment: Gummies    Sexual activity: Not on file   Other Topics Concern    Not on file   Social History Narrative    Not on file     Social Drivers of Health     Financial Resource Strain: Unknown (3/5/2022)    Received from [facility], [facility]    Financial Resource Strain      Overall Financial Strain: 99     Skipped Doctor's Visit: 3     Skipped Medication due to cost: 3     Utility Shut-offs: 3   Food Insecurity: Patient Unable To Answer (1/7/2025)    Received from [facility]    Hunger Vital Sign     Worried About Running Out of Food in the Last Year: Patient unable to answer     Ran Out of Food in the Last Year: Patient unable to answer   Transportation Needs: Patient Unable To Answer (1/7/2025)    Received from [facility]    PRAPARE - Transportation     Lack of Transportation (Medical): Patient unable to answer     Lack of Transportation (Non-Medical): Patient unable to answer   Physical Activity: Not on file   Stress: Not on file   Social Connections: Not on file   Intimate Partner Violence: Patient Unable To Answer (1/7/2025)    Received from [facility]    Humiliation, Afraid, Rape, and Kick questionnaire     Fear of Current or Ex-Partner: Patient unable to answer     Emotionally Abused: Patient unable to answer     Physically Abused: Patient unable to answer     Sexually Abused: Patient unable to answer   Housing Stability: Patient Unable To Answer (1/7/2025)    Received from [facility]    Housing Stability Vital Sign     Unable to Pay for Housing in the Last Year: Patient unable to answer     Number of Times Moved in the Last Year: 1     Homeless in the Last Year: Patient unable to answer         Vitals:    03/13/25 1328   BP: 121/78   Pulse: 90   Resp: 14   Temp: 36.7 °C (98 °F)   SpO2: 96%                Physical Exam  Vitals reviewed.   Constitutional:       Comments: Intermittently leaning forward and lying on right side to relieve pain   HENT:      Head: Normocephalic and atraumatic.   Cardiovascular:      Rate and Rhythm: Normal rate and regular rhythm.      Heart sounds: Normal heart sounds.   Pulmonary:      Effort: Pulmonary effort is normal.      Breath sounds: Normal breath sounds.   Abdominal:      Tenderness: There is abdominal tenderness. There is guarding. There is  no right CVA tenderness or left CVA tenderness.      Comments: Worst in the LLQ, LUQ, and RLQ   Musculoskeletal:         General: Normal range of motion.      Cervical back: Normal range of motion.   Skin:     General: Skin is warm and dry.   Neurological:      Mental Status: She is alert and oriented to person, place, and time.   Psychiatric:         Mood and Affect: Mood normal.         Behavior: Behavior normal.           LABORATORY @ RADIOLOGICAL IMAGING (if done):     No results found for this or any previous visit (from the past 24 hours).    ____________________________________________________________________    I did personally review Usha's past medical history, surgical history, social history, as well as family history (when relevant).  In this case, I also oversaw the her drug management by reviewing her medication list, allergy list, as well as the medications that I prescribed during the UC course and/or recommended as an out-patient (including possible OTC medications such as acetaminophen, NSAIDs , etc).    After reviewing the items above, I did look at previous medical documentation, such as recent hospitalizations, office visits, and/or recent consultations with PCP/specialist.                          SDOH:   Another factor that I considered in Usha's care was her Social Determinants of Health (SDOH). During this UC encounter, she did not have social determinants of health. Those SDOH influencing Usha's care are: none      UC COURSE/MEDICAL DECISION MAKING:    Usha is a 50 y.o., who presents with a working diagnosis of   1. Left ovarian cyst    2. Pelvic pain     with a differential to include:   Ruptured ovarian cyst, ovarian torsion, tubo-ovarian abscess, ectopic pregnancy, acute cystitis, pyelonephritis, diverticulitis, iliopsoas abscess    Plan:   Current plan is to treat this patient with Cipro as that she has a nitrate positive urine, we also discussed the need for follow  through with gynecology and she has an appointment on the 21st of this month to discuss ongoing care of this left ovarian cyst.  Its only slightly increased in size from a few centimeters from January, there is still some echogenic evidence of normal arterial/venous flow, patient was reassured, anti-inflammatory Toradol was provided IM in the office, and she was sent home with significant other.    I, KATHIE Beckwith student, helped prepare the medical record for my supervising clinician, Leonides Harvey PA-C.   CHERYL Beckwith, [facility]    Supervised by  Leonides Harvey PA-C   Advanced Practice Provider  [facility]    I was present with the PA student who participated in the documentation of this note. I have personally seen and re-examined the patient and performed the medical decision-making components (assessment and plan of care). I have reviewed the PA student documentation and verified the findings in the note as written with additions or exceptions as stated in the body of this note.    CHERYL Greenfield

## 2025-03-15 NOTE — RESULT ENCOUNTER NOTE
Please call the patient regarding her abnormal result. Urine reveals + bladder infection, results are still pending, but she would benefit from continuing with the antibiotic.

## 2025-03-16 LAB — BACTERIA UR CULT: ABNORMAL

## 2025-03-18 ENCOUNTER — TELEPHONE (OUTPATIENT)
Dept: URGENT CARE | Facility: CLINIC | Age: 50
End: 2025-03-18
Payer: MEDICARE

## 2025-03-18 NOTE — TELEPHONE ENCOUNTER
Result Communication    Resulted Orders   Urine culture   Result Value Ref Range    CULTURE, URINE, ROUTINE SEE NOTE (A)       Comment:          CULTURE, URINE, ROUTINE         Micro Number:      [id number]    Test Status:       Final    Specimen Source:   Urine, clean catch    Specimen Quality:  Adequate    Result:            Greater than 100,000 CFU/mL of Escherichia coli                              E.coli                            ----------------                            INT   SALBADOR     AMOX/CLAVULANATE       S     <=2     AMP/SULBACTAM          S     <=2     CEFAZOLIN              NR    <=4 **2     CEFEPIME               S     <=0.12     CEFTAZIDIME            S     <=1     CEFTRIAXONE            S     <=0.25     CIPROFLOXACIN          S     <=0.06     GENTAMICIN             S     <=1     IMIPENEM               S     <=0.25     LEVOFLOXACIN           S     <=0.12     MEROPENEM              S     <=0.25     NITROFURANTOIN         S     <=16     PIP/TAZOBACTAM         S     <=4     TRIMETHOPRIM/SULFA     S     <=20    S = Susceptible  I = Intermediate  R = Resistant  NS = Not susceptible  SDD = Susceptible Dose Dependent   * = Not Tested  NR = Not Reported  **NN = See Therapy Comments      THERAPY COMMENTS        Note 1:      For infections other than uncomplicated UTI      caused by E. coli, K. pneumoniae or P. mirabilis:      Cefazolin is resistant if SALBADOR > or = 8 mcg/mL.      (Distinguishing susceptible versus intermediate      for isolates with SALBADOR < or = 4 mcg/mL requires      additional testing.)        Note 2:      For uncomplicated UTI caused by E. coli,      K. pneumoniae or P. mirabilis: Cefazolin is      susceptible if SALBADOR <32 mcg/mL and predicts      susceptible to the oral agents cefaclor, cefdinir,      cefpodoxime, cefprozil, cefuroxime, cephalexin      and loracarbef.         3:37 PM      Results were not successfully communicated with the patient and they did not acknowledge their  understanding.

## 2025-03-18 NOTE — TELEPHONE ENCOUNTER
----- Message from Leonides Harvey sent at 3/18/2025  9:09 AM EDT -----  Please call the patient regarding her abnormal result. Medication provided should have reduced overall problem.

## 2025-03-19 ENCOUNTER — APPOINTMENT (OUTPATIENT)
Dept: PRIMARY CARE | Facility: CLINIC | Age: 50
End: 2025-03-19
Payer: MEDICARE

## 2025-03-19 VITALS
BODY MASS INDEX: 35.31 KG/M2 | HEART RATE: 88 BPM | DIASTOLIC BLOOD PRESSURE: 68 MMHG | HEIGHT: 67 IN | WEIGHT: 225 LBS | SYSTOLIC BLOOD PRESSURE: 110 MMHG

## 2025-03-19 DIAGNOSIS — E66.812 CLASS 2 SEVERE OBESITY DUE TO EXCESS CALORIES WITH SERIOUS COMORBIDITY AND BODY MASS INDEX (BMI) OF 35.0 TO 35.9 IN ADULT: ICD-10-CM

## 2025-03-19 DIAGNOSIS — G89.29 CHRONIC BILATERAL LOW BACK PAIN, UNSPECIFIED WHETHER SCIATICA PRESENT: ICD-10-CM

## 2025-03-19 DIAGNOSIS — F41.9 ANXIETY: ICD-10-CM

## 2025-03-19 DIAGNOSIS — E11.42 DIABETIC POLYNEUROPATHY ASSOCIATED WITH TYPE 2 DIABETES MELLITUS: ICD-10-CM

## 2025-03-19 DIAGNOSIS — F90.9 ATTENTION DEFICIT HYPERACTIVITY DISORDER (ADHD), UNSPECIFIED ADHD TYPE: ICD-10-CM

## 2025-03-19 DIAGNOSIS — M54.50 CHRONIC BILATERAL LOW BACK PAIN, UNSPECIFIED WHETHER SCIATICA PRESENT: ICD-10-CM

## 2025-03-19 DIAGNOSIS — E66.01 CLASS 2 SEVERE OBESITY DUE TO EXCESS CALORIES WITH SERIOUS COMORBIDITY AND BODY MASS INDEX (BMI) OF 35.0 TO 35.9 IN ADULT: ICD-10-CM

## 2025-03-19 DIAGNOSIS — E11.9 TYPE 2 DIABETES MELLITUS WITHOUT COMPLICATION, WITHOUT LONG-TERM CURRENT USE OF INSULIN: ICD-10-CM

## 2025-03-19 DIAGNOSIS — F32.0 CURRENT MILD EPISODE OF MAJOR DEPRESSIVE DISORDER WITHOUT PRIOR EPISODE (CMS-HCC): Primary | ICD-10-CM

## 2025-03-19 DIAGNOSIS — Z12.11 ENCOUNTER FOR SCREENING FOR MALIGNANT NEOPLASM OF COLON: ICD-10-CM

## 2025-03-19 LAB
ALBUMIN SERPL-MCNC: 4.4 G/DL (ref 3.6–5.1)
ALBUMIN/CREAT UR: 9 MG/G CREAT
ALP SERPL-CCNC: 126 U/L (ref 31–125)
ALT SERPL-CCNC: 18 U/L (ref 6–29)
ANION GAP SERPL CALCULATED.4IONS-SCNC: 13 MMOL/L (CALC) (ref 7–17)
AST SERPL-CCNC: 29 U/L (ref 10–35)
BASOPHILS # BLD AUTO: 86 CELLS/UL (ref 0–200)
BASOPHILS NFR BLD AUTO: 0.6 %
BILIRUB SERPL-MCNC: 0.4 MG/DL (ref 0.2–1.2)
BUN SERPL-MCNC: 9 MG/DL (ref 7–25)
CALCIUM SERPL-MCNC: 9.5 MG/DL (ref 8.6–10.2)
CHLORIDE SERPL-SCNC: 101 MMOL/L (ref 98–110)
CHOLEST SERPL-MCNC: 163 MG/DL
CHOLEST/HDLC SERPL: 5.3 (CALC)
CO2 SERPL-SCNC: 21 MMOL/L (ref 20–32)
CREAT SERPL-MCNC: 0.54 MG/DL (ref 0.5–0.99)
CREAT UR-MCNC: 44 MG/DL (ref 20–275)
EGFRCR SERPLBLD CKD-EPI 2021: 113 ML/MIN/1.73M2
EOSINOPHIL # BLD AUTO: 286 CELLS/UL (ref 15–500)
EOSINOPHIL NFR BLD AUTO: 2 %
ERYTHROCYTE [DISTWIDTH] IN BLOOD BY AUTOMATED COUNT: 15.2 % (ref 11–15)
EST. AVERAGE GLUCOSE BLD GHB EST-MCNC: 189 MG/DL
EST. AVERAGE GLUCOSE BLD GHB EST-SCNC: 10.4 MMOL/L
GLUCOSE SERPL-MCNC: 130 MG/DL (ref 65–99)
HBA1C MFR BLD: 8.2 % OF TOTAL HGB
HCT VFR BLD AUTO: 40.5 % (ref 35–45)
HDLC SERPL-MCNC: 31 MG/DL
HGB BLD-MCNC: 13.2 G/DL (ref 11.7–15.5)
LDLC SERPL CALC-MCNC: ABNORMAL MG/DL
LYMPHOCYTES # BLD AUTO: 4490 CELLS/UL (ref 850–3900)
LYMPHOCYTES NFR BLD AUTO: 31.4 %
MCH RBC QN AUTO: 28 PG (ref 27–33)
MCHC RBC AUTO-ENTMCNC: 32.6 G/DL (ref 32–36)
MCV RBC AUTO: 86 FL (ref 80–100)
MICROALBUMIN UR-MCNC: 0.4 MG/DL
MONOCYTES # BLD AUTO: 701 CELLS/UL (ref 200–950)
MONOCYTES NFR BLD AUTO: 4.9 %
NEUTROPHILS # BLD AUTO: 8737 CELLS/UL (ref 1500–7800)
NEUTROPHILS NFR BLD AUTO: 61.1 %
NONHDLC SERPL-MCNC: 132 MG/DL (CALC)
PLATELET # BLD AUTO: 429 THOUSAND/UL (ref 140–400)
PMV BLD REES-ECKER: 10.4 FL (ref 7.5–12.5)
POTASSIUM SERPL-SCNC: 3.8 MMOL/L (ref 3.5–5.3)
PROT SERPL-MCNC: 7.2 G/DL (ref 6.1–8.1)
RBC # BLD AUTO: 4.71 MILLION/UL (ref 3.8–5.1)
SODIUM SERPL-SCNC: 135 MMOL/L (ref 135–146)
TRIGL SERPL-MCNC: 995 MG/DL
TSH SERPL-ACNC: 1.52 MIU/L
VIT B12 SERPL-MCNC: 306 PG/ML (ref 200–1100)
WBC # BLD AUTO: 14.3 THOUSAND/UL (ref 3.8–10.8)

## 2025-03-19 PROCEDURE — G2211 COMPLEX E/M VISIT ADD ON: HCPCS | Performed by: STUDENT IN AN ORGANIZED HEALTH CARE EDUCATION/TRAINING PROGRAM

## 2025-03-19 PROCEDURE — 3078F DIAST BP <80 MM HG: CPT | Performed by: STUDENT IN AN ORGANIZED HEALTH CARE EDUCATION/TRAINING PROGRAM

## 2025-03-19 PROCEDURE — 4010F ACE/ARB THERAPY RXD/TAKEN: CPT | Performed by: STUDENT IN AN ORGANIZED HEALTH CARE EDUCATION/TRAINING PROGRAM

## 2025-03-19 PROCEDURE — 99214 OFFICE O/P EST MOD 30 MIN: CPT | Performed by: STUDENT IN AN ORGANIZED HEALTH CARE EDUCATION/TRAINING PROGRAM

## 2025-03-19 PROCEDURE — 3074F SYST BP LT 130 MM HG: CPT | Performed by: STUDENT IN AN ORGANIZED HEALTH CARE EDUCATION/TRAINING PROGRAM

## 2025-03-19 PROCEDURE — 3008F BODY MASS INDEX DOCD: CPT | Performed by: STUDENT IN AN ORGANIZED HEALTH CARE EDUCATION/TRAINING PROGRAM

## 2025-03-19 RX ORDER — ARIPIPRAZOLE 5 MG/1
5 TABLET ORAL DAILY
Qty: 5 TABLET | Refills: 1 | Status: SHIPPED | OUTPATIENT
Start: 2025-03-19

## 2025-03-19 RX ORDER — ARIPIPRAZOLE 10 MG/1
10 TABLET ORAL DAILY
Qty: 30 TABLET | Refills: 1 | OUTPATIENT
Start: 2025-03-19

## 2025-03-19 NOTE — PROGRESS NOTES
"Subjective   Patient ID: Usha Rowland is a 50 y.o. female who presents for 3 month check. On antibiotic for uti, one pill left     HPI  Migraines - recently established with neurology (Dr. Gonzalez), amitriptyline was discontinued, Ajovy and prn Maxalt were started, patient states that she is feeling improved with the regimen, c spine mri was ordered but not yet scheduled, she was also further referred to psychiatry and sleep medicine but states has not yet heard to schedule    Anx/dep - did well with Abilify decrease, we will continue to wean, I agree with psychiatry establishment, pt has been hesitant but it now agreeable    DM2 - HbA1c improved from 8.7% to 8.2%, she is currently managed on metformin 1000mg bid, Jardiance 25mg every day, Ozempic 0.5mg qwk, tries to be mindful of her diet, no formal exercise but tries to stay active    ADHD - currently managed on Adderall xr 10mg which she takes as needed only on school days, she requests increase in dose    Review of Systems   Constitutional:  Negative for chills and fever.   Respiratory:  Negative for cough and shortness of breath.    Cardiovascular:  Negative for chest pain and palpitations.   Musculoskeletal:  Positive for back pain.   Skin:  Negative for rash.   Neurological:  Positive for headaches.   Psychiatric/Behavioral:  Positive for decreased concentration. Negative for dysphoric mood. The patient is not nervous/anxious.      Objective   /68   Pulse 88   Ht 1.702 m (5' 7\")   Wt 102 kg (225 lb)   BMI 35.24 kg/m²     Physical Exam  Constitutional:       Appearance: Normal appearance.   HENT:      Head: Normocephalic and atraumatic.   Eyes:      General: No scleral icterus.     Conjunctiva/sclera: Conjunctivae normal.   Cardiovascular:      Rate and Rhythm: Normal rate and regular rhythm.   Pulmonary:      Effort: Pulmonary effort is normal. No respiratory distress.      Breath sounds: Normal breath sounds.   Musculoskeletal:      Cervical back: " Normal range of motion and neck supple.   Skin:     General: Skin is warm and dry.      Findings: No rash.   Neurological:      General: No focal deficit present.      Mental Status: She is alert.   Psychiatric:         Mood and Affect: Mood normal.         Behavior: Behavior normal.       Assessment/Plan   Problem List Items Addressed This Visit             ICD-10-CM    Type 2 diabetes mellitus without complication, without long-term current use of insulin (Multi) E11.9     - Managed on metformin 1000mg bid, Jardiance 25mg every day, Ozempic 0.5mg qwk  - HbA1c improved but remains above goal at 8.2%  - We reviewed options for medication adjustment  - Will increase Ozempic to 1mg qwk  - Medication dosing and side effects reviewed.   - Dietary modifications and recommendation for 150 minutes of moderate-intensity exercise per week reviewed.          Relevant Medications    semaglutide (OZEMPIC) 1 mg/dose (4 mg/3 mL) pen injector    Other Relevant Orders    Comprehensive Metabolic Panel    CBC and Auto Differential    Lipid Panel    Hemoglobin A1C    Follow Up In Primary Care - Established    Diabetic polyneuropathy associated with type 2 diabetes mellitus (Multi) E11.42    Relevant Orders    Referral to Pain Medicine    Follow Up In Primary Care - Established    Current mild episode of major depressive disorder without prior episode (CMS-[facility]) - Primary F32.0     - Will continue Abilify wean in setting of polypharmacy  - Agree with psychiatry establishment         Relevant Orders    Referral to Psychiatry    Follow Up In Primary Care - Established    Class 2 severe obesity due to excess calories with serious comorbidity and body mass index (BMI) of 35.0 to 35.9 in adult E66.812, E66.01, Z68.35    Chronic back pain M54.9, G89.29     - Persistent despite current medication regimen  - Using shared decision making, we decided to proceed with pain management evaluation  - Referral placed         Relevant Orders    Referral  to Pain Medicine    Follow Up In Primary Care - Established    Attention deficit hyperactivity disorder (ADHD) F90.9     - Managed on Adderall xr 10mg every day  - Takes only as needed on school days  - She requests increase in dose  - I am hesitant due to polypharmacy, but we ultimately decided to increase to 15mg for now  - I have personally reviewed the OARRS for this patient. I have considered the risk dependence, addiction, and diversion. There are no concerns at this time. I believe that it is clinically appropriate for this patient to be prescribed this medication based on documented diagnosis.          Relevant Medications    amphetamine-dextroamphetamine XR (Adderall XR) 15 mg 24 hr capsule (Start on 4/5/2025)    Other Relevant Orders    Follow Up In Primary Care - Established    Anxiety F41.9    Relevant Medications    ARIPiprazole (Abilify) 5 mg tablet    Other Relevant Orders    Referral to Psychiatry    Follow Up In Primary Care - Established     Other Visit Diagnoses         Codes    Encounter for screening for malignant neoplasm of colon     Z12.11    Relevant Orders    Follow Up In Primary Care - Established    Referral to Gastroenterology        Follow up in 3mo for recheck, sooner if needed. Labs prior to appt.

## 2025-03-23 RX ORDER — DEXTROAMPHETAMINE SACCHARATE, AMPHETAMINE ASPARTATE MONOHYDRATE, DEXTROAMPHETAMINE SULFATE AND AMPHETAMINE SULFATE 3.75; 3.75; 3.75; 3.75 MG/1; MG/1; MG/1; MG/1
15 CAPSULE, EXTENDED RELEASE ORAL EVERY MORNING
Qty: 30 CAPSULE | Refills: 0 | Status: SHIPPED | OUTPATIENT
Start: 2025-04-05 | End: 2025-05-05

## 2025-03-23 ASSESSMENT — ENCOUNTER SYMPTOMS
NERVOUS/ANXIOUS: 0
COUGH: 0
DECREASED CONCENTRATION: 1
SHORTNESS OF BREATH: 0
PALPITATIONS: 0
DYSPHORIC MOOD: 0
BACK PAIN: 1
CHILLS: 0
FEVER: 0
HEADACHES: 1

## 2025-03-24 NOTE — ASSESSMENT & PLAN NOTE
- Persistent despite current medication regimen  - Using shared decision making, we decided to proceed with pain management evaluation  - Referral placed

## 2025-03-24 NOTE — ASSESSMENT & PLAN NOTE
- Managed on Adderall xr 10mg every day  - Takes only as needed on school days  - She requests increase in dose  - I am hesitant due to polypharmacy, but we ultimately decided to increase to 15mg for now  - I have personally reviewed the OARRS for this patient. I have considered the risk dependence, addiction, and diversion. There are no concerns at this time. I believe that it is clinically appropriate for this patient to be prescribed this medication based on documented diagnosis.

## 2025-03-24 NOTE — ASSESSMENT & PLAN NOTE
- Managed on metformin 1000mg bid, Jardiance 25mg every day, Ozempic 0.5mg qwk  - HbA1c improved but remains above goal at 8.2%  - We reviewed options for medication adjustment  - Will increase Ozempic to 1mg qwk  - Medication dosing and side effects reviewed.   - Dietary modifications and recommendation for 150 minutes of moderate-intensity exercise per week reviewed.

## 2025-04-09 ENCOUNTER — OFFICE VISIT (OUTPATIENT)
Dept: PAIN MEDICINE | Facility: CLINIC | Age: 50
End: 2025-04-09
Payer: MEDICARE

## 2025-04-09 VITALS
HEIGHT: 67 IN | HEART RATE: 84 BPM | SYSTOLIC BLOOD PRESSURE: 108 MMHG | WEIGHT: 236 LBS | DIASTOLIC BLOOD PRESSURE: 71 MMHG | BODY MASS INDEX: 37.04 KG/M2 | RESPIRATION RATE: 16 BRPM

## 2025-04-09 DIAGNOSIS — M47.816 LUMBAR SPONDYLOSIS: Primary | ICD-10-CM

## 2025-04-09 DIAGNOSIS — E11.42 DIABETIC POLYNEUROPATHY ASSOCIATED WITH TYPE 2 DIABETES MELLITUS: ICD-10-CM

## 2025-04-09 DIAGNOSIS — M54.50 CHRONIC BILATERAL LOW BACK PAIN, UNSPECIFIED WHETHER SCIATICA PRESENT: ICD-10-CM

## 2025-04-09 DIAGNOSIS — G89.29 CHRONIC BILATERAL LOW BACK PAIN, UNSPECIFIED WHETHER SCIATICA PRESENT: ICD-10-CM

## 2025-04-09 PROCEDURE — 99213 OFFICE O/P EST LOW 20 MIN: CPT | Mod: 25 | Performed by: STUDENT IN AN ORGANIZED HEALTH CARE EDUCATION/TRAINING PROGRAM

## 2025-04-09 PROCEDURE — 99406 BEHAV CHNG SMOKING 3-10 MIN: CPT | Performed by: STUDENT IN AN ORGANIZED HEALTH CARE EDUCATION/TRAINING PROGRAM

## 2025-04-09 RX ORDER — LIDOCAINE HYDROCHLORIDE 20 MG/ML
10 INJECTION, SOLUTION EPIDURAL; INFILTRATION; INTRACAUDAL; PERINEURAL ONCE
Status: CANCELLED | OUTPATIENT
Start: 2025-04-09 | End: 2025-04-09

## 2025-04-09 RX ORDER — LIDOCAINE HYDROCHLORIDE 20 MG/ML
10 INJECTION, SOLUTION EPIDURAL; INFILTRATION; INTRACAUDAL; PERINEURAL ONCE
OUTPATIENT
Start: 2025-04-09 | End: 2025-04-09

## 2025-04-09 RX ORDER — DIAZEPAM 5 MG/1
5 TABLET ORAL ONCE
Qty: 1 TABLET | Refills: 0 | Status: SHIPPED | OUTPATIENT
Start: 2025-04-09 | End: 2025-04-09

## 2025-04-09 RX ORDER — BUPIVACAINE HYDROCHLORIDE 5 MG/ML
3 INJECTION, SOLUTION EPIDURAL; INTRACAUDAL; PERINEURAL ONCE
Status: CANCELLED | OUTPATIENT
Start: 2025-04-09 | End: 2025-04-09

## 2025-04-09 RX ORDER — ETODOLAC 400 MG/1
400 TABLET, FILM COATED ORAL 2 TIMES DAILY
COMMUNITY
Start: 2025-03-03

## 2025-04-09 ASSESSMENT — PATIENT HEALTH QUESTIONNAIRE - PHQ9
1. LITTLE INTEREST OR PLEASURE IN DOING THINGS: NOT AT ALL
2. FEELING DOWN, DEPRESSED OR HOPELESS: NOT AT ALL
SUM OF ALL RESPONSES TO PHQ9 QUESTIONS 1 AND 2: 0

## 2025-04-09 ASSESSMENT — COLUMBIA-SUICIDE SEVERITY RATING SCALE - C-SSRS
2. HAVE YOU ACTUALLY HAD ANY THOUGHTS OF KILLING YOURSELF?: NO
6. HAVE YOU EVER DONE ANYTHING, STARTED TO DO ANYTHING, OR PREPARED TO DO ANYTHING TO END YOUR LIFE?: NO
1. IN THE PAST MONTH, HAVE YOU WISHED YOU WERE DEAD OR WISHED YOU COULD GO TO SLEEP AND NOT WAKE UP?: NO

## 2025-04-09 NOTE — H&P (VIEW-ONLY)
Subjective   Patient ID: Usha Rowland is a 50 y.o. female who presents for Back Pain (NPV-Patient in Pain clinic today for chief complaint of MISBAH LBP that radiates down her knee mostly on the right starting with a job related injury 26 years ago.  Rates pain 6/10 and describes it as burning, sharp. /).Pain is increased with activities such as standing for long periods, lifting anything over 10 lbs, bending, ADL's  Activities unable to perform related to pain housework, cooking, ADL's.  Pt. Has tried PT, Percocet, tylenol, ice and heat in the past. PT has had injections with Dr Armenta. No surgery NSAID'S TENS unit in the past year. SOAPP- LOUIS-56% 19 DEP-neg FALLS-N/A SMOKING-pos ed given    HPI  Patient presenting with a longstanding history of lumbar spondyloarthropathy as well as diabetic neuropathy and myofascial pain.  She underwent 2 bilateral lumbar medial branch blocks within the past 6 months and feels that it provided greater than 80% relief targeting the L4/5 and L5/S1 facet joints bilaterally.  She would like to switch pain management physicians and she rates her low back pain as an 8/10 right now but can be a 10/10 when acutely exacerbated exacerbated by typically twisting turning and bending and no radicular components at this time.  She does have a history of diabetic neuropathy as well with burning sensations in her bilateral lower extremities and she is on pregabalin for this with good efficacy.  We did discuss that we can take over this medication or consider increasing the dose if required.  We also discussed at length smoking cessation.  Review of Systems   All other systems reviewed and are negative.      Objective   Physical Exam  Constitutional: No acute distress, well appearing and well nourished. Patient appears stated age.  Eyes: Conjunctiva non-icteric and eye lids are without obvious rash or drooping. Pupils are symmetric.  Ears, Nose, Mouth, and Throat: External ears and nose appear to  be without deformity or rash. No lesions or masses noted.  Hearing is grossly intact.  Neck: No JVD noted, tracheal position is midline.  Head and Face: Examination of the head and face revealed no abnormalities.  Respiratory: No gasping or shortness of breath noted, no use of accessory muscles noted.  Cardiovascular: Examination for edema is normal.   GI: Abdomen nontender to palpation.  Skin: No rashes or open lesions/ulcers identified on examined areas.    MSK:   No asymmetry or masses noted of the musculature.   Examination of the muscles/joints/bones show grossly normal range of motion unless noted below.    Neurologic:  Motor strength:   5/5 muscle strength of the upper extremities bilateral and equal.  5/5 muscle strength of the lower extremities bilaterally and equal.     Sensation:   Sensation intact to light touch in the bilateral upper and lower extremities, diminished in the mid shin down to the feet and nondermatomal distribution bilaterally.      Provocative tests: Negative Betty sign bilaterally, seated straight leg raise does not reproduce radicular signs bilaterally, tenderness palpation lumbar paraspinal muscular lumbar facet loading reproducing axial pain bilaterally.    Psychiatric: Mood and affect are normal.    Assessment/Plan   Diagnoses and all orders for this visit:  Lumbar spondylosis  -     FL pain management; Future  -      Medial Nerve Branch Block; Future  Chronic bilateral low back pain, unspecified whether sciatica present  -     Referral to Pain Medicine  -     FL pain management; Future  -      Medial Nerve Branch Block; Future  Diabetic polyneuropathy associated with type 2 diabetes mellitus  -     Referral to Pain Medicine  -     FL pain management; Future  -      Medial Nerve Branch Block; Future  Other orders  -     NPO Diet Except: Sips with meds; Effective now; Standing  -     Height and weight; Standing  -     POCT Glucose; Standing  -     Adult diet Regular; Standing  -      Vital Signs; Standing  -     Notify provider (specify parameters); Standing  -     Continue IV fluids ordered pre-procedure; Standing  -     Prior to Discharge O2 Weaning; Standing  -     Pulse oximetry, continuous; Standing  -     Discharge patient; Standing  -     iohexol (OMNIPaque) 300 mg iodine/mL solution 3 mL  -     lidocaine PF (Xylocaine) 20 mg/mL (2 %) injection 200 mg  -     bupivacaine PF (Marcaine) 0.5 % (5 mg/mL) injection 15 mg  The patient´s history, physical exam and personal review of imaging indicate diagnoses of the above items.    Plan description:  - She has undergone 2 bilateral lumbar medial branch blocks targeting L4/5 and L5/S1 facet joints under fluoroscopic guidance in the past 6 months with greater than 80% relief per her report.  We will continue to proceed with a bilateral lumbar medial branch radiofrequency ablation targeting the L4/5 and L5/S1 facet joints under fluoroscopic guidance  - Encouraged continuation of pregabalin, we discussed that we can take over this medication if required for peripheral neuropathy  - Smoking cessation counseling provided today at length      Counseling:  The patient was counseled regarding diagnostic results, instructions for management, risk factor reductions, prognosis, patient and family education, impressions, risk and benefits of treatment options, as well as adherence to current treatment regimen. The importance of physical therapy/core strengthening was discussed in regard to an appropriate level for the patient to participate in currently, as well as progress as able. Nicotine and alcohol use were reviewed and discussed as appropriate in relation to cessation and abstinence as indiciated, time spent for smoking cessation counseling when appropriate is 3-10 minutes. Appropriate use of opioid medications if prescribed as well as adherance and compliance to routine screening and avoidance of any medication that causes side effects in combination  such as benzodiazepines. OARRS reviewed when indicated and naloxone offered if opioid medication prescribed.    All questions and concerns were addressed during clinical visit. Patient verbalized understanding and agreement with the current plan and counselling. The patient was invited to contact us back anytime with any questions or concerns and follow-up with us in the future.  Patient has successfully completed         Lumbar MRI report and attached below  EXAMINATION:   MR LUMBAR SPINE WITHOUT CONTRAST     HISTORY:   ORDERING SYSTEM PROVIDED HISTORY:  Back trauma, no prior imaging (Age >= 16y); Lumbar radiculopathy,      TECHNOLOGIST PROVIDED HISTORY:    Illness/Other   Reason for exam: Back trauma, no prior imaging (Age >= 16y), Lumbar radiculopathy, Lumbar radiculopathy   Encounter Type: Unknown   Additional signs and symptoms: none     ORDERING SYSTEM PROVIDED DIAGNOSIS CODES:   M54.16 Lumbar radiculopathy       COMPARISON:   None.     TECHNIQUE:   MRI of the lumbar spine without contrast.  Sequences obtained by standard department protocol.     CONTRAST:   No intravenous contrast was utilized.     FINDINGS:   The conus ends at L1.  No abnormal signal of the terminal spinal cord.     T11-T12 through L3-L4:  No spinal canal stenosis or neural foraminal stenosis.     L4-L5:  Intervertebral disc height is preserved.  No spinal canal stenosis.  No neural foraminal stenosis.  Mild facet joint arthropathy.     L5-S1:  Mild disc degeneration.  Mild broad-based posterior disc bulge.  No spinal canal stenosis or neural foraminal stenosis.  Facet joints are normal.     Sacroiliac joints are normal.     No acute bone marrow edema.  No acute fractures.  Vertebral body height is preserved.     KRYSTAL ELLIOTT LPN 04/09/25 10:34 AM

## 2025-04-09 NOTE — PATIENT INSTRUCTIONS
Stop aspirin/Excedrin migraine, etodolac for 7 days  Hold all vitamins/supplements for 1 week  Injection education completed written and verbally.

## 2025-04-10 ENCOUNTER — TELEPHONE (OUTPATIENT)
Dept: PAIN MEDICINE | Facility: CLINIC | Age: 50
End: 2025-04-10
Payer: MEDICARE

## 2025-04-10 NOTE — TELEPHONE ENCOUNTER
BILATERAL L4-L5 AND L5-S1 RFA CPT 12044/27292 DX M47.816  APPROVED AUTH#[id number] VALID 4/25/25-5/08/25

## 2025-04-14 ENCOUNTER — PATIENT OUTREACH (OUTPATIENT)
Dept: PRIMARY CARE | Facility: CLINIC | Age: 50
End: 2025-04-14
Payer: MEDICARE

## 2025-04-14 NOTE — PROGRESS NOTES
Patient has met target of no readmission for (90) days post (hospital, SNF, rehab) discharge and is graduated from Transitional Care Management program at this time.    Landon Grijalva LPN

## 2025-04-16 ENCOUNTER — OFFICE VISIT (OUTPATIENT)
Dept: URGENT CARE | Facility: CLINIC | Age: 50
End: 2025-04-16
Payer: MEDICARE

## 2025-04-16 VITALS
DIASTOLIC BLOOD PRESSURE: 80 MMHG | TEMPERATURE: 99 F | HEART RATE: 100 BPM | SYSTOLIC BLOOD PRESSURE: 114 MMHG | RESPIRATION RATE: 15 BRPM | OXYGEN SATURATION: 96 %

## 2025-04-16 DIAGNOSIS — M54.50 LUMBAR BACK PAIN: Primary | ICD-10-CM

## 2025-04-16 PROCEDURE — 99213 OFFICE O/P EST LOW 20 MIN: CPT | Performed by: NURSE PRACTITIONER

## 2025-04-16 RX ORDER — METHYLPREDNISOLONE 4 MG/1
TABLET ORAL
Qty: 21 TABLET | Refills: 0 | Status: SHIPPED | OUTPATIENT
Start: 2025-04-16 | End: 2025-04-22

## 2025-04-16 RX ORDER — CYCLOBENZAPRINE HCL 5 MG
5 TABLET ORAL 3 TIMES DAILY
Qty: 30 TABLET | Refills: 0 | Status: SHIPPED | OUTPATIENT
Start: 2025-04-16 | End: 2025-04-26

## 2025-04-16 NOTE — PROGRESS NOTES
[facility] URGENT CARE   SARATH NOTE:      Name: Usha Rowland, 50 y.o.    CSN:[id number]   MRN:[id number]    PCP: Ivania Gold, DO    ALL:  Allergies[1]    History:    Chief Complaint: Back Pain (After moving a heavy object x yesterday)    Encounter Date: 4/16/2025  [id number]    HPI: The history was obtained from the patient. Usha is a 50 y.o. female, who presents with a chief complaint of midline lower back pain that began approximately 1 days ago after moving a couch. The pain is described as constant, located in the lumbar region. The pain does not radiates, is worsened by standing and bending. The patient denies any recent trauma or injury but reports heavy lifting. Denies numbness, tingling, weakness. Denies bowel or bladder dysfunction. There is history of similar back pain in the past (many years ago).  Has been utilizing Tylenol and ibuprofen with only minimal improvement of symptoms.  Denies any fevers, chills, weight loss, night sweats.  Denies history of malignancy, IV drug use or recent infection.         PMHx:    Medical History[2]         Current Medications[3]      PMSx:  Surgical History[4]    Fam Hx: Family History[5]    SOC. Hx:     Social History     Socioeconomic History    Marital status: Single     Spouse name: Not on file    Number of children: Not on file    Years of education: Not on file    Highest education level: Not on file   Occupational History    Not on file   Tobacco Use    Smoking status: Every Day     Current packs/day: 1.00     Types: Cigarettes    Smokeless tobacco: Never   Vaping Use    Vaping status: Never Used   Substance and Sexual Activity    Alcohol use: Not Currently    Drug use: Yes     Types: Marijuana     Comment: Gummies    Sexual activity: Not on file   Other Topics Concern    Not on file   Social History Narrative    Not on file     Social Drivers of Health     Financial Resource Strain: Unknown (3/5/2022)    Received from [facility] and  Affiliates - [facility]    Financial Resource Strain     Overall Financial Strain: 99     Skipped Doctor's Visit: 3     Skipped Medication due to cost: 3     Utility Shut-offs: 3   Food Insecurity: Patient Unable To Answer (1/7/2025)    Received from [facility]    Hunger Vital Sign     Worried About Running Out of Food in the Last Year: Patient unable to answer     Ran Out of Food in the Last Year: Patient unable to answer   Transportation Needs: Patient Unable To Answer (1/7/2025)    Received from [facility]    PRAPARE - Transportation     Lack of Transportation (Medical): Patient unable to answer     Lack of Transportation (Non-Medical): Patient unable to answer   Physical Activity: Not on file   Stress: Not on file   Social Connections: Not on file   Intimate Partner Violence: Patient Unable To Answer (1/7/2025)    Received from [facility]    Humiliation, Afraid, Rape, and Kick questionnaire     Fear of Current or Ex-Partner: Patient unable to answer     Emotionally Abused: Patient unable to answer     Physically Abused: Patient unable to answer     Sexually Abused: Patient unable to answer   Housing Stability: Patient Unable To Answer (1/7/2025)    Received from [facility]    Housing Stability Vital Sign     Unable to Pay for Housing in the Last Year: Patient unable to answer     Number of Times Moved in the Last Year: 1     Homeless in the Last Year: Patient unable to answer         Vitals:    04/16/25 1236   BP: 114/80   Pulse: 100   Resp: 15   Temp: 37.2 °C (99 °F)   SpO2: 96%                Physical Exam  Vitals and nursing note reviewed.   Constitutional:       Appearance: Normal appearance.   HENT:      Head: Normocephalic and atraumatic.      Mouth/Throat:      Mouth: Mucous membranes are moist.      Pharynx: Oropharynx is clear.   Eyes:      Pupils: Pupils are equal, round, and reactive to light.   Cardiovascular:      Rate and Rhythm: Normal rate and regular rhythm.      Pulses: Normal  pulses.      Heart sounds: Normal heart sounds.   Pulmonary:      Effort: Pulmonary effort is normal.      Breath sounds: Normal breath sounds.   Abdominal:      General: Abdomen is flat. Bowel sounds are normal.      Palpations: Abdomen is soft.   Musculoskeletal:         General: Normal range of motion.      Cervical back: Normal range of motion.      Lumbar back: Spasms and tenderness present. No swelling, edema, deformity, signs of trauma, lacerations or bony tenderness. Normal range of motion. Negative right straight leg raise test and negative left straight leg raise test. No scoliosis.   Skin:     General: Skin is warm and dry.      Capillary Refill: Capillary refill takes less than 2 seconds.   Neurological:      Mental Status: She is alert and oriented to person, place, and time.           LABORATORY @ RADIOLOGICAL IMAGING (if done):     Declines lumbar xray  ____________________________________________________________________    I did personally review Usha's past medical history, surgical history, social history, as well as family history (when relevant).  In this case, I also oversaw the her drug management by reviewing her medication list, allergy list, as well as the medications that I prescribed during the UC course and/or recommended as an out-patient (including possible OTC medications such as acetaminophen, NSAIDs , etc).    After reviewing the items above, I did look at previous medical documentation, such as recent hospitalizations, office visits, and/or recent consultations with PCP/specialist.                          SDOH:   Another factor that I considered in Usha's care was her Social Determinants of Health (SDOH). During this UC encounter, she did not have social determinants of health. Those SDOH influencing Usha's care are: none      _____________________________________________________________________      UC COURSE/MEDICAL DECISION MAKING:    Usha is a 50 y.o., who  presents with a working diagnosis of   1. Lumbar back pain     with a differential to include: Sprain, strain, contusion, fracture, avulsion fracture, dislocation, tendinitis, tenosynovitis     Plan:  1) begin Medrol Dosepak  2) Flexeril muscle relaxer every 8 hours as needed, educated to avoid driving, running machinery, making important decisions, drive a vehicle while taking medication  3) heat and ice as needed  4) over-the-counter Tylenol and/or Motrin as needed for pain  5) return to PCP, urgent care, or emergency department if symptoms worsen or do not improve for further evaluation including x-ray which patient declined at today's visit.    She denies any saddle anesthesia, distal radicular symptoms, or bowel/bladder incontinence. She denies any fever or any significant trauma which would make me concerned for fracture. She states that his pain is worse with movement.     On examination, she has some generalized lower back tenderness to palpation.  he lower extremity strength, sensation, and DTRs are normal B/L.    She has no significant bony tenderness that would make me be concerned for osteomyelitis, epidural abscess, discitis, or vertebral fracture.  I also have low suspicion for any vascular etiologies such as AAA, other arterial aneurysm, or end-organ infarction.  Nor do I suspect that her pain is from any urological (ie stone) or GI (ie diverticulosis) sources.    I suspect that her pain is most likely due to soft tissue dysfunction (muscular, ligamentous, etc).  I recommended some rest for now, & stressed the importance of PCP follow up for persistent symptoms.  Finally, she was told to return to our local UC/ED immediately if her condition worsens in any way.     KARINA Holloway [facility] Advanced Practice Provider  [facility] URGENT CARE    Please note: While the patient may or may not have received printed discharge paperwork, all relevant medical findings, test results, and  treatment details are accessible through the electronic medical record system. The patient is encouraged to review their chart via the patient portal for comprehensive information and follow-up instructions.       [1]   Allergies  Allergen Reactions    Ammonia Rash    Metoclopramide Hives, Rash and Unknown     Feels like skin is crawling   Feels like skin is crawling    Feels like skin is crawling  REGLAN  Feels like skin is crawling   Feels like skin is crawling   Feels like skin is crawling   Feels like skin is crawling    Dog Dander Rash    Horse Dander Rash    Latex Rash     Noted when wearing gloves. Latex testing done 03/17/10    Noted when wearing gloves. Latex testing done 03/17/10   Noted when wearing gloves. Latex testing done 03/17/10   [2] No past medical history on file.  [3]   Current Outpatient Medications   Medication Sig Dispense Refill    albuterol 90 mcg/actuation inhaler Inhale 2 puffs every 6 hours if needed for wheezing. 18 g 2    amphetamine-dextroamphetamine XR (Adderall XR) 15 mg 24 hr capsule Take 1 capsule (15 mg) by mouth once daily in the morning. Do not crush or chew. Do not fill before April 5, 2025. 30 capsule 0    ARIPiprazole (Abilify) 5 mg tablet Take 1 tablet (5 mg) by mouth once daily. 5 tablet 1    aspirin-acetaminophen-caffeine (Excedrin Migraine) 250-250-65 mg tablet Take 1 tablet by mouth every 6 hours if needed for headaches.      blood sugar diagnostic (Blood Glucose Test) strip 1 strip once daily. 100 each 1    blood-glucose meter misc 1 each once daily. 1 each 0    dicyclomine (Bentyl) 10 mg capsule TAKE ONE CAPSULE BY MOUTH TWICE DAILY AS NEEDED FOR ABDOMINAL PAIN OR CRAMPS 60 capsule 2    dilTIAZem (Cardizem) 60 mg immediate release tablet Take 1 tablet (60 mg) by mouth 2 times a day. 60 tablet 5    DULoxetine (Cymbalta) 30 mg DR capsule Take 1 capsule (30 mg) by mouth once daily. 30 capsule 5    DULoxetine (Cymbalta) 60 mg DR capsule Take 1 capsule (60 mg) by mouth  once daily. 30 capsule 5    empagliflozin (Jardiance) 25 mg Take 1 tablet (25 mg) by mouth once daily. 30 tablet 5    etodolac (Lodine) 400 mg tablet Take 1 tablet (400 mg) by mouth 2 times a day.      famotidine (Pepcid) 10 mg tablet Take 1 tablet (10 mg) by mouth 2 times a day. 60 tablet 5    fenofibrate (Fenoglide) 40 mg tablet Take 2 tablets (80 mg) by mouth once daily. 60 tablet 5    flash glucose scanning reader (FreeStyle Elvin 2 [place]) misc Use as instructed 1 each 0    flash glucose sensor kit (FreeStyle Elvin 2 Sensor) kit Change every 14 days 6 each 1    fluticasone (Flonase) 50 mcg/actuation nasal spray Administer 1 spray into each nostril once daily. Shake gently. Before first use, prime pump. After use, clean tip and replace cap. 16 g 1    lancets misc 1 Lancet once daily. 100 each 1    lisinopril 5 mg tablet Take 1 tablet (5 mg) by mouth once daily. 30 tablet 5    loperamide (Imodium) 2 mg capsule Take 1 capsule (2 mg) by mouth 4 times a day as needed for diarrhea. 30 capsule 2    memantine (Namenda) 10 mg tablet Take 1 tablet (10 mg) by mouth once daily. 30 tablet 5    metFORMIN (Glucophage) 1,000 mg tablet Take 1 tablet (1,000 mg) by mouth 2 times daily (morning and late afternoon). 180 tablet 1    omeprazole (PriLOSEC) 40 mg DR capsule Take 1 capsule (40 mg) by mouth once daily in the morning. Do not crush or chew. 30 capsule 5    pregabalin (Lyrica) 300 mg capsule Take 1 capsule (300 mg) by mouth 2 times a day. 60 capsule 3    QUEtiapine (SEROquel) 100 mg tablet Take 1 tablet (100 mg) by mouth once daily at bedtime. 90 tablet 1    rosuvastatin (Crestor) 20 mg tablet Take 1 tablet (20 mg) by mouth once daily in the evening. Take with meals. 30 tablet 5    semaglutide (OZEMPIC) 1 mg/dose (4 mg/3 mL) pen injector Inject 1 mg under the skin 1 (one) time per week. 3 mL 1    tiZANidine (Zanaflex) 2 mg tablet TAKE ONE TABLET BY MOUTH EVERY MORNING AND TAKE TWO TABLETS BY MOUTH AT BEDTIME 90 tablet 3     cyclobenzaprine (Flexeril) 5 mg tablet Take 1 tablet (5 mg) by mouth 3 times a day for 10 days. 30 tablet 0    diazePAM (Valium) 5 mg tablet Take 1 tablet (5 mg) by mouth 1 time for 1 dose. Please bring with you on the day of the procedure and take as instructed by staff, have a  with you. 1 tablet 0    methylPREDNISolone (Medrol Dospak) 4 mg tablets Follow schedule on package instructions 21 tablet 0     No current facility-administered medications for this visit.   [4]   Past Surgical History:  Procedure Laterality Date    CHOLECYSTECTOMY      ENDOMETRIAL ABLATION      INNER EAR SURGERY      Ear drum   [5] No family history on file.

## 2025-04-30 DIAGNOSIS — E11.42 DIABETIC POLYNEUROPATHY ASSOCIATED WITH TYPE 2 DIABETES MELLITUS: ICD-10-CM

## 2025-04-30 DIAGNOSIS — R19.7 DIARRHEA, UNSPECIFIED TYPE: ICD-10-CM

## 2025-04-30 DIAGNOSIS — F41.9 ANXIETY: ICD-10-CM

## 2025-05-02 RX ORDER — ARIPIPRAZOLE 5 MG/1
5 TABLET ORAL DAILY
Qty: 30 TABLET | Refills: 3 | Status: SHIPPED | OUTPATIENT
Start: 2025-05-02

## 2025-05-02 RX ORDER — DICYCLOMINE HYDROCHLORIDE 10 MG/1
CAPSULE ORAL
Qty: 60 CAPSULE | Refills: 3 | Status: SHIPPED | OUTPATIENT
Start: 2025-05-02

## 2025-05-02 RX ORDER — PREGABALIN 300 MG/1
300 CAPSULE ORAL 2 TIMES DAILY
Qty: 60 CAPSULE | Refills: 3 | Status: SHIPPED | OUTPATIENT
Start: 2025-05-02 | End: 2027-11-23

## 2025-05-06 ENCOUNTER — APPOINTMENT (OUTPATIENT)
Dept: PAIN MEDICINE | Facility: CLINIC | Age: 50
End: 2025-05-06
Payer: MEDICARE

## 2025-05-09 ENCOUNTER — HOSPITAL ENCOUNTER (OUTPATIENT)
Dept: OPERATING ROOM | Facility: HOSPITAL | Age: 50
Setting detail: OUTPATIENT SURGERY
Discharge: HOME | End: 2025-05-09
Payer: MEDICARE

## 2025-05-09 VITALS
BODY MASS INDEX: 34.34 KG/M2 | RESPIRATION RATE: 16 BRPM | SYSTOLIC BLOOD PRESSURE: 115 MMHG | HEART RATE: 91 BPM | HEIGHT: 67 IN | DIASTOLIC BLOOD PRESSURE: 85 MMHG | OXYGEN SATURATION: 93 % | TEMPERATURE: 98.9 F | WEIGHT: 218.8 LBS

## 2025-05-09 DIAGNOSIS — M47.816 LUMBAR SPONDYLOSIS: ICD-10-CM

## 2025-05-09 DIAGNOSIS — F90.9 ATTENTION DEFICIT HYPERACTIVITY DISORDER (ADHD), UNSPECIFIED ADHD TYPE: ICD-10-CM

## 2025-05-09 LAB — GLUCOSE BLD MANUAL STRIP-MCNC: 166 MG/DL (ref 74–99)

## 2025-05-09 PROCEDURE — 2500000004 HC RX 250 GENERAL PHARMACY W/ HCPCS (ALT 636 FOR OP/ED): Mod: JZ | Performed by: STUDENT IN AN ORGANIZED HEALTH CARE EDUCATION/TRAINING PROGRAM

## 2025-05-09 PROCEDURE — 82947 ASSAY GLUCOSE BLOOD QUANT: CPT

## 2025-05-09 PROCEDURE — 64635 DESTROY LUMB/SAC FACET JNT: CPT | Mod: 50 | Performed by: STUDENT IN AN ORGANIZED HEALTH CARE EDUCATION/TRAINING PROGRAM

## 2025-05-09 PROCEDURE — 2720000007 HC OR 272 NO HCPCS

## 2025-05-09 RX ORDER — LIDOCAINE HYDROCHLORIDE 20 MG/ML
10 INJECTION, SOLUTION EPIDURAL; INFILTRATION; INTRACAUDAL; PERINEURAL ONCE
Status: DISCONTINUED | OUTPATIENT
Start: 2025-05-09 | End: 2025-05-10 | Stop reason: HOSPADM

## 2025-05-09 RX ORDER — DEXTROAMPHETAMINE SACCHARATE, AMPHETAMINE ASPARTATE MONOHYDRATE, DEXTROAMPHETAMINE SULFATE AND AMPHETAMINE SULFATE 3.75; 3.75; 3.75; 3.75 MG/1; MG/1; MG/1; MG/1
15 CAPSULE, EXTENDED RELEASE ORAL EVERY MORNING
Qty: 30 CAPSULE | Refills: 0 | Status: SHIPPED | OUTPATIENT
Start: 2025-05-09 | End: 2025-06-08

## 2025-05-09 RX ORDER — LIDOCAINE HYDROCHLORIDE 20 MG/ML
10 INJECTION, SOLUTION EPIDURAL; INFILTRATION; INTRACAUDAL; PERINEURAL ONCE
Status: COMPLETED | OUTPATIENT
Start: 2025-05-09 | End: 2025-05-09

## 2025-05-09 RX ADMIN — LIDOCAINE HYDROCHLORIDE 15 ML: 20 INJECTION, SOLUTION EPIDURAL; INFILTRATION; INTRACAUDAL; PERINEURAL at 09:50

## 2025-05-09 ASSESSMENT — PATIENT HEALTH QUESTIONNAIRE - PHQ9
2. FEELING DOWN, DEPRESSED OR HOPELESS: NOT AT ALL
SUM OF ALL RESPONSES TO PHQ9 QUESTIONS 1 AND 2: 0
1. LITTLE INTEREST OR PLEASURE IN DOING THINGS: NOT AT ALL

## 2025-05-09 ASSESSMENT — PAIN SCALES - GENERAL
PAINLEVEL_OUTOF10: 5 - MODERATE PAIN
PAINLEVEL_OUTOF10: 3

## 2025-05-09 ASSESSMENT — PAIN - FUNCTIONAL ASSESSMENT: PAIN_FUNCTIONAL_ASSESSMENT: 0-10

## 2025-05-09 ASSESSMENT — COLUMBIA-SUICIDE SEVERITY RATING SCALE - C-SSRS
6. HAVE YOU EVER DONE ANYTHING, STARTED TO DO ANYTHING, OR PREPARED TO DO ANYTHING TO END YOUR LIFE?: NO
2. HAVE YOU ACTUALLY HAD ANY THOUGHTS OF KILLING YOURSELF?: NO
1. IN THE PAST MONTH, HAVE YOU WISHED YOU WERE DEAD OR WISHED YOU COULD GO TO SLEEP AND NOT WAKE UP?: NO

## 2025-05-09 ASSESSMENT — PAIN DESCRIPTION - DESCRIPTORS: DESCRIPTORS: BURNING

## 2025-05-09 NOTE — Clinical Note
ARRIVED TO ROOM PER CART AND MOVED SELF TO TABLE.  PATIENT IN PRONE POSITION PADDED WITH PILLOWS, PREPPED WITH TEAL CHLOROPREP PER DR LEAHY, FIRE RISK SCORE 3 FOR OPEN OXYGEN/ALCOHOL PREP AND RFA PROBE, 3 MINUTE DRY TIME OBSERVED BEFORE DRAPING.  RFA UNIT  SET TO 80 DEGREES C FOR 80 SECONDS X 2 BURNS, GROUNDING PAD SITE CLEAR BEFORE AND AFTER PROCEDURE.  OPSITE DRESSED WITH BANDAID, REPORT GIVEN TO TAO AMIN RN.

## 2025-05-09 NOTE — PERIOPERATIVE NURSING NOTE
Discharge instructions reviewed  verbally by  Keiko FRANK RN      written instructions provided to pt, no questions and verbalized understanding.   discharged amb steady gait, to exit   to be driven home by  brooek Smith

## 2025-05-19 ENCOUNTER — APPOINTMENT (OUTPATIENT)
Dept: PAIN MEDICINE | Facility: CLINIC | Age: 50
End: 2025-05-19
Payer: MEDICARE

## 2025-05-20 ENCOUNTER — APPOINTMENT (OUTPATIENT)
Dept: PAIN MEDICINE | Facility: CLINIC | Age: 50
End: 2025-05-20
Payer: MEDICARE

## 2025-05-27 ENCOUNTER — APPOINTMENT (OUTPATIENT)
Dept: PAIN MEDICINE | Facility: CLINIC | Age: 50
End: 2025-05-27
Payer: MEDICARE

## 2025-05-27 ENCOUNTER — TELEPHONE (OUTPATIENT)
Dept: PAIN MEDICINE | Facility: CLINIC | Age: 50
End: 2025-05-27

## 2025-05-27 VITALS
SYSTOLIC BLOOD PRESSURE: 115 MMHG | RESPIRATION RATE: 18 BRPM | HEART RATE: 83 BPM | DIASTOLIC BLOOD PRESSURE: 76 MMHG | WEIGHT: 219 LBS | BODY MASS INDEX: 34.29 KG/M2

## 2025-05-27 DIAGNOSIS — M47.816 LUMBAR SPONDYLOSIS: Primary | ICD-10-CM

## 2025-05-27 DIAGNOSIS — E11.42 DIABETIC POLYNEUROPATHY ASSOCIATED WITH TYPE 2 DIABETES MELLITUS: ICD-10-CM

## 2025-05-27 PROCEDURE — 99213 OFFICE O/P EST LOW 20 MIN: CPT

## 2025-05-27 RX ORDER — GABAPENTIN 600 MG/1
1200 TABLET ORAL 3 TIMES DAILY
Qty: 180 TABLET | Refills: 2 | Status: SHIPPED | OUTPATIENT
Start: 2025-05-27 | End: 2025-08-25

## 2025-05-27 RX ORDER — ETODOLAC 400 MG/1
400 TABLET, FILM COATED ORAL 2 TIMES DAILY
Qty: 60 TABLET | Refills: 2 | Status: SHIPPED | OUTPATIENT
Start: 2025-05-27

## 2025-05-27 ASSESSMENT — ENCOUNTER SYMPTOMS
WHEEZING: 0
BRUISES/BLEEDS EASILY: 0
EYES NEGATIVE: 1
CONSTITUTIONAL NEGATIVE: 1
LIGHT-HEADEDNESS: 0
ARTHRALGIAS: 0
SHORTNESS OF BREATH: 0
COUGH: 0
DYSPHORIC MOOD: 0
FACIAL ASYMMETRY: 0
ALLERGIC/IMMUNOLOGIC NEGATIVE: 1
BACK PAIN: 1
WEAKNESS: 0
MYALGIAS: 1
PALPITATIONS: 0
ADENOPATHY: 0
ENDOCRINE NEGATIVE: 1

## 2025-05-27 NOTE — PROGRESS NOTES
Subjective   Patient ID: Usha Rowland is a 50 y.o. female who presents for Follow-up (FUV for Bilat L4/5-L5/S1 MB RFA on 5/9/25 she reports 75% relief so far. Today she is having pain in her Bilat lower back and bilat hand and feet rates pain 6/10, describes as burning in hands and feet, her back is aching is made worse with standing, walking, lifting or carrying anything for a short distance. She is taking Tylenol and Lyrica for pain this helps a little. ) She would like to discuss her Lyrica as at first she was getting relief but now it seem it is not helping.   LOUIS score  52%, Alcohol screen negative.  Ema Zhang RN 05/27/25 10:37 AM     The patient is a 50-year-old female who presents today for a follow-up appointment after undergoing a bilateral medial branch radiofrequency ablation covering the L4-S1 facet joints on 5/9/2025.  The patient reports significant relief from this procedure with 75% pain relief that is ongoing.  She reports being better able to function due to the pain relief she has obtained from this procedure, and is better able to perform ADLs.  She is now noticing increased pain in bilateral hands and bilateral feet from her neuropathy.  She does not feel as if the Lyrica is working as well as it has in the past.  We discussed that as she is at the max dose, we could try a rotation back to gabapentin to see if this would provide any better relief overall, patient is agreeable.  It appears an equivalent dose would be gabapentin 1200 mg 3 times a day.  PDMP reviewed.  Of note, she mentions there is a possibility that she will be moving out to [place] in the near future, and is worried about how long it may take to get into a new provider there.  We discussed that depending on if the gabapentin is helpful, we could always send in a 90-day supply before her move to give her plenty of time to find another provider.        Review of Systems   Constitutional: Negative.    HENT: Negative.      Eyes: Negative.    Respiratory:  Negative for cough, shortness of breath and wheezing.    Cardiovascular:  Negative for chest pain, palpitations and leg swelling.   Endocrine: Negative.    Genitourinary: Negative.    Musculoskeletal:  Positive for back pain and myalgias. Negative for arthralgias.   Skin: Negative.    Allergic/Immunologic: Negative.    Neurological:  Negative for facial asymmetry, weakness and light-headedness.   Hematological:  Negative for adenopathy. Does not bruise/bleed easily.   Psychiatric/Behavioral:  Negative for dysphoric mood and suicidal ideas.        Objective   Physical Exam  Constitutional:       General: She is not in acute distress.     Appearance: Normal appearance.   HENT:      Head: Normocephalic.      Mouth/Throat:      Mouth: Mucous membranes are moist.   Eyes:      Extraocular Movements: Extraocular movements intact.   Cardiovascular:      Rate and Rhythm: Normal rate and regular rhythm.      Pulses: Normal pulses.      Heart sounds: Normal heart sounds. No murmur heard.     No friction rub. No gallop.   Pulmonary:      Effort: Pulmonary effort is normal.      Breath sounds: Normal breath sounds. No wheezing, rhonchi or rales.   Abdominal:      General: Abdomen is flat.      Palpations: Abdomen is soft.   Musculoskeletal:      Cervical back: Normal range of motion.      Right lower leg: No edema.      Left lower leg: No edema.      Comments: Ambulates without assistance  Strength 5/5 BLE   Lymphadenopathy:      Cervical: No cervical adenopathy.   Skin:     General: Skin is warm and dry.   Neurological:      General: No focal deficit present.      Mental Status: She is alert and oriented to person, place, and time. Mental status is at baseline.   Psychiatric:         Mood and Affect: Mood normal.         Behavior: Behavior normal.         Assessment/Plan   Diagnoses and all orders for this visit:  Lumbar spondylosis  Diabetic polyneuropathy associated with type 2 diabetes  mellitus  -     gabapentin (Neurontin) 600 mg tablet; Take 2 tablets (1,200 mg) by mouth 3 times a day.       The patient is a 50-year-old female with a past medical history significant for the above-mentioned problems.  She is following up after lumbar RFA with significant ongoing relief.  Her primary complaint is her neuropathy pain.  We will send in gabapentin 1200 mg 3 times a day to see if this can provide any better relief overall.  I will also send in a compound cream for her to try to see if this can also bring any further pain relief.  Patient is agreeable.  She will follow-up with us in 1 month, call clinic sooner if needed.

## 2025-06-02 ENCOUNTER — PATIENT OUTREACH (OUTPATIENT)
Dept: PRIMARY CARE | Facility: CLINIC | Age: 50
End: 2025-06-02
Payer: MEDICARE

## 2025-06-02 NOTE — PROGRESS NOTES
Discharge facility: [facility]   Discharge diagnosis:  UTI (urinary tract infection)   Acute pyelonephritis     Admission date: 5/30/25  Discharge date: 6/1/25    PCP Appointment Date: 6/25/25, Unsuccessful attempts x2 to reach patient for PCP Follow-up, message sent to office  Specialist Appointment Date: 7/23/25 Pain med  Hospital Encounter and Summary: Linked    Hospital Encounter      Two attempts were made to reach patient within two business days after discharge. Left voicemail with contact information for patient to call back  regarding hospital follow up

## 2025-06-07 DIAGNOSIS — E11.9 TYPE 2 DIABETES MELLITUS WITHOUT COMPLICATION, WITHOUT LONG-TERM CURRENT USE OF INSULIN: ICD-10-CM

## 2025-06-07 DIAGNOSIS — F90.9 ATTENTION DEFICIT HYPERACTIVITY DISORDER (ADHD), UNSPECIFIED ADHD TYPE: ICD-10-CM

## 2025-06-09 ENCOUNTER — APPOINTMENT (OUTPATIENT)
Dept: PAIN MEDICINE | Facility: CLINIC | Age: 50
End: 2025-06-09
Payer: MEDICARE

## 2025-06-11 RX ORDER — FLASH GLUCOSE SENSOR
KIT MISCELLANEOUS
Qty: 6 EACH | Refills: 1 | Status: SHIPPED | OUTPATIENT
Start: 2025-06-11

## 2025-06-11 RX ORDER — DEXTROAMPHETAMINE SACCHARATE, AMPHETAMINE ASPARTATE MONOHYDRATE, DEXTROAMPHETAMINE SULFATE AND AMPHETAMINE SULFATE 3.75; 3.75; 3.75; 3.75 MG/1; MG/1; MG/1; MG/1
15 CAPSULE, EXTENDED RELEASE ORAL EVERY MORNING
Qty: 30 CAPSULE | Refills: 0 | Status: SHIPPED | OUTPATIENT
Start: 2025-06-11 | End: 2025-07-11

## 2025-06-17 ENCOUNTER — PATIENT OUTREACH (OUTPATIENT)
Dept: PRIMARY CARE | Facility: CLINIC | Age: 50
End: 2025-06-17
Payer: MEDICARE

## 2025-06-17 NOTE — PROGRESS NOTES
Confirmation of at least 2 patient identifiers.    Completed telephonic follow-up with patient approximately 14 days post discharge, no PCP follow up.   Spoke to patient during outreach call.    Patient reports feeling: Improved    Patient has questions or concerns about medications: No    Have all prescribed medications been filled? Yes    Patient has necessary resources to manage their care? Yes    Patient has questions or concerns? No    Patient reports she is no longer residing in [place], She states she now resides in [place] and establishing care there with new PCP. Discussed appts that are currently scheduled in EMR. She states she will call and cancel them. No concerns at this time.       Next care management follow-up approximately within one month.  Care  information provided to patient.

## 2025-06-24 ENCOUNTER — TELEPHONE (OUTPATIENT)
Dept: PAIN MEDICINE | Facility: CLINIC | Age: 50
End: 2025-06-24
Payer: MEDICARE

## 2025-06-24 DIAGNOSIS — E11.42 DIABETIC POLYNEUROPATHY ASSOCIATED WITH TYPE 2 DIABETES MELLITUS: ICD-10-CM

## 2025-06-24 RX ORDER — GABAPENTIN 600 MG/1
1200 TABLET ORAL 3 TIMES DAILY
Qty: 180 TABLET | Refills: 2 | Status: SHIPPED | OUTPATIENT
Start: 2025-06-24 | End: 2025-09-22

## 2025-06-25 ENCOUNTER — APPOINTMENT (OUTPATIENT)
Dept: PRIMARY CARE | Facility: CLINIC | Age: 50
End: 2025-06-25
Payer: MEDICARE

## 2025-06-25 NOTE — TELEPHONE ENCOUNTER
[facility], [facility]  [phone]  Previous pharmacy does not not fill gabapentin, please send to above pharmacy.

## 2025-06-26 DIAGNOSIS — F32.0 CURRENT MILD EPISODE OF MAJOR DEPRESSIVE DISORDER WITHOUT PRIOR EPISODE: ICD-10-CM

## 2025-06-26 DIAGNOSIS — E78.5 DYSLIPIDEMIA: ICD-10-CM

## 2025-06-26 DIAGNOSIS — R41.89 COGNITIVE DECLINE: ICD-10-CM

## 2025-06-26 DIAGNOSIS — R19.7 DIARRHEA, UNSPECIFIED TYPE: ICD-10-CM

## 2025-06-26 DIAGNOSIS — I10 PRIMARY HYPERTENSION: ICD-10-CM

## 2025-06-26 DIAGNOSIS — F51.01 PRIMARY INSOMNIA: ICD-10-CM

## 2025-06-26 DIAGNOSIS — K21.9 GASTROESOPHAGEAL REFLUX DISEASE, UNSPECIFIED WHETHER ESOPHAGITIS PRESENT: ICD-10-CM

## 2025-06-26 DIAGNOSIS — F41.9 ANXIETY: ICD-10-CM

## 2025-06-26 DIAGNOSIS — E11.9 TYPE 2 DIABETES MELLITUS WITHOUT COMPLICATION, WITHOUT LONG-TERM CURRENT USE OF INSULIN: ICD-10-CM

## 2025-06-26 NOTE — TELEPHONE ENCOUNTER
Luis called in to say that Usha moved to [place], and needs refills on ALL of her medications. They plan on coming back for her appointment. I advised him that her appt was scheduled for yesterday (6/25/25). I did advise that we can send in a temporary supply (30 day) so she had time to find PCP there, but we cannot do any controlled substances. Pharmacy:   [facility] on [facility] in   [place], [place]

## 2025-06-27 RX ORDER — LOPERAMIDE HYDROCHLORIDE 2 MG/1
2 CAPSULE ORAL 4 TIMES DAILY PRN
Qty: 30 CAPSULE | Refills: 0 | Status: SHIPPED | OUTPATIENT
Start: 2025-06-27

## 2025-06-27 RX ORDER — QUETIAPINE FUMARATE 100 MG/1
100 TABLET, FILM COATED ORAL NIGHTLY
Qty: 90 TABLET | Refills: 0 | Status: SHIPPED | OUTPATIENT
Start: 2025-06-27

## 2025-06-27 RX ORDER — METFORMIN HYDROCHLORIDE 1000 MG/1
1000 TABLET ORAL
Qty: 180 TABLET | Refills: 0 | Status: SHIPPED | OUTPATIENT
Start: 2025-06-27

## 2025-06-27 RX ORDER — DICYCLOMINE HYDROCHLORIDE 10 MG/1
CAPSULE ORAL
Qty: 180 CAPSULE | Refills: 0 | Status: SHIPPED | OUTPATIENT
Start: 2025-06-27

## 2025-06-27 RX ORDER — FAMOTIDINE 10 MG/1
10 TABLET ORAL 2 TIMES DAILY
Qty: 180 TABLET | Refills: 0 | Status: SHIPPED | OUTPATIENT
Start: 2025-06-27

## 2025-06-27 RX ORDER — LISINOPRIL 5 MG/1
5 TABLET ORAL DAILY
Qty: 90 TABLET | Refills: 0 | Status: SHIPPED | OUTPATIENT
Start: 2025-06-27

## 2025-06-27 RX ORDER — ARIPIPRAZOLE 5 MG/1
5 TABLET ORAL DAILY
Qty: 90 TABLET | Refills: 0 | Status: SHIPPED | OUTPATIENT
Start: 2025-06-27

## 2025-06-27 RX ORDER — ROSUVASTATIN CALCIUM 20 MG/1
20 TABLET, COATED ORAL
Qty: 30 TABLET | Refills: 0 | Status: SHIPPED | OUTPATIENT
Start: 2025-06-27 | End: 2025-07-27

## 2025-06-27 RX ORDER — MEMANTINE HYDROCHLORIDE 10 MG/1
10 TABLET ORAL DAILY
Qty: 90 TABLET | Refills: 0 | Status: SHIPPED | OUTPATIENT
Start: 2025-06-27 | End: 2026-06-27

## 2025-06-27 RX ORDER — DILTIAZEM HYDROCHLORIDE 60 MG/1
60 TABLET, FILM COATED ORAL 2 TIMES DAILY
Qty: 180 TABLET | Refills: 0 | Status: SHIPPED | OUTPATIENT
Start: 2025-06-27

## 2025-06-27 RX ORDER — FENOFIBRATE 40 MG/1
80 TABLET ORAL DAILY
Qty: 180 TABLET | Refills: 0 | Status: SHIPPED | OUTPATIENT
Start: 2025-06-27

## 2025-06-27 RX ORDER — OMEPRAZOLE 40 MG/1
40 CAPSULE, DELAYED RELEASE ORAL EVERY MORNING
Qty: 90 CAPSULE | Refills: 0 | Status: SHIPPED | OUTPATIENT
Start: 2025-06-27

## 2025-06-27 RX ORDER — DULOXETIN HYDROCHLORIDE 60 MG/1
60 CAPSULE, DELAYED RELEASE ORAL DAILY
Qty: 90 CAPSULE | Refills: 0 | Status: SHIPPED | OUTPATIENT
Start: 2025-06-27

## 2025-06-27 RX ORDER — DULOXETIN HYDROCHLORIDE 30 MG/1
30 CAPSULE, DELAYED RELEASE ORAL DAILY
Qty: 90 CAPSULE | Refills: 0 | Status: SHIPPED | OUTPATIENT
Start: 2025-06-27

## 2025-06-30 ENCOUNTER — TELEPHONE (OUTPATIENT)
Dept: PRIMARY CARE | Facility: CLINIC | Age: 50
End: 2025-06-30
Payer: MEDICARE

## 2025-06-30 NOTE — TELEPHONE ENCOUNTER
Pt  called stating that Pt has discussed trying to stop smoking with you in the past and is ready to give it a try. She would like to try nicotine patches if possible. Pt wants to use [facility]. Thank you.

## 2025-07-02 DIAGNOSIS — F17.210 CIGARETTE NICOTINE DEPENDENCE WITHOUT COMPLICATION: Primary | ICD-10-CM

## 2025-07-02 RX ORDER — IBUPROFEN 200 MG
1 TABLET ORAL EVERY 24 HOURS
Qty: 30 PATCH | Refills: 0 | Status: SHIPPED | OUTPATIENT
Start: 2025-07-02 | End: 2025-08-01

## 2025-07-23 ENCOUNTER — APPOINTMENT (OUTPATIENT)
Dept: PAIN MEDICINE | Facility: CLINIC | Age: 50
End: 2025-07-23
Payer: MEDICARE

## 2025-08-20 ENCOUNTER — PATIENT OUTREACH (OUTPATIENT)
Dept: PRIMARY CARE | Facility: CLINIC | Age: 50
End: 2025-08-20
Payer: MEDICARE